# Patient Record
Sex: FEMALE | Race: WHITE | Employment: OTHER | URBAN - METROPOLITAN AREA
[De-identification: names, ages, dates, MRNs, and addresses within clinical notes are randomized per-mention and may not be internally consistent; named-entity substitution may affect disease eponyms.]

---

## 2017-02-06 ENCOUNTER — HOSPITAL ENCOUNTER (OUTPATIENT)
Dept: GENERAL RADIOLOGY | Age: 62
Discharge: HOME | End: 2017-02-06
Attending: INTERNAL MEDICINE
Payer: COMMERCIAL

## 2017-02-06 ENCOUNTER — OFFICE VISIT (OUTPATIENT)
Dept: INTERNAL MEDICINE CLINIC | Facility: CLINIC | Age: 62
End: 2017-02-06

## 2017-02-06 VITALS
WEIGHT: 229 LBS | HEART RATE: 92 BPM | HEIGHT: 63 IN | TEMPERATURE: 100 F | BODY MASS INDEX: 40.57 KG/M2 | DIASTOLIC BLOOD PRESSURE: 80 MMHG | SYSTOLIC BLOOD PRESSURE: 114 MMHG | RESPIRATION RATE: 12 BRPM

## 2017-02-06 DIAGNOSIS — R50.9 FEVER, UNSPECIFIED FEVER CAUSE: ICD-10-CM

## 2017-02-06 DIAGNOSIS — R05.9 COUGH: Primary | ICD-10-CM

## 2017-02-06 DIAGNOSIS — R05.9 COUGH: ICD-10-CM

## 2017-02-06 DIAGNOSIS — J02.9 SORE THROAT: ICD-10-CM

## 2017-02-06 LAB
CONTROL LINE PRESENT WITH A CLEAR BACKGROUND (YES/NO): YES YES/NO
KIT LOT #: NORMAL NUMERIC
STREP GRP A CUL-SCR: NEGATIVE

## 2017-02-06 PROCEDURE — 99213 OFFICE O/P EST LOW 20 MIN: CPT | Performed by: INTERNAL MEDICINE

## 2017-02-06 PROCEDURE — 87880 STREP A ASSAY W/OPTIC: CPT | Performed by: INTERNAL MEDICINE

## 2017-02-06 PROCEDURE — 71020 XR CHEST PA + LAT CHEST (CPT=71020): CPT

## 2017-02-06 PROCEDURE — 99214 OFFICE O/P EST MOD 30 MIN: CPT | Performed by: INTERNAL MEDICINE

## 2017-02-06 RX ORDER — CODEINE PHOSPHATE AND GUAIFENESIN 10; 100 MG/5ML; MG/5ML
5 SOLUTION ORAL EVERY 6 HOURS PRN
Qty: 240 ML | Refills: 0 | Status: SHIPPED | OUTPATIENT
Start: 2017-02-06 | End: 2019-01-03

## 2017-02-06 RX ORDER — AZITHROMYCIN 250 MG/1
TABLET, FILM COATED ORAL
Qty: 6 TABLET | Refills: 0 | Status: SHIPPED | OUTPATIENT
Start: 2017-02-06 | End: 2017-02-10

## 2017-02-06 NOTE — PROGRESS NOTES
HPI:    Patient ID: Stas Ricketts is a 64year old female. Sore Throat   This is a new problem. The current episode started in the past 7 days (3 darys). The problem has been unchanged.  The maximum temperature recorded prior to her arrival was 100.4 - Oral Tab Take  by mouth. Disp:  Rfl:    Loratadine-Pseudoephedrine ER (CLARITIN-D 24 HOUR)  MG Oral Tablet 24 Hr Take  by mouth.  Disp:  Rfl:      Allergies:No Known Allergies   PHYSICAL EXAM:   Physical Exam   Constitutional: She appears well-develop Rapid Strep [[id number]]      No orders of the defined types were placed in this encounter.        Meds This Visit:  No prescriptions requested or ordered in this encounter    Imaging & Referrals:  None       KT#[id number]

## 2017-02-09 ENCOUNTER — TELEPHONE (OUTPATIENT)
Dept: INTERNAL MEDICINE CLINIC | Facility: CLINIC | Age: 62
End: 2017-02-09

## 2017-02-09 NOTE — TELEPHONE ENCOUNTER
Wife requesting copy of pt's kidney CT report  Requesting to p/u copy when she is at her 2:30 appt tomorrow with Dr Andrés Good on file

## 2017-02-10 ENCOUNTER — OFFICE VISIT (OUTPATIENT)
Dept: INTERNAL MEDICINE CLINIC | Facility: CLINIC | Age: 62
End: 2017-02-10

## 2017-02-10 VITALS
DIASTOLIC BLOOD PRESSURE: 79 MMHG | BODY MASS INDEX: 40.75 KG/M2 | HEIGHT: 63 IN | WEIGHT: 230 LBS | TEMPERATURE: 98 F | HEART RATE: 73 BPM | OXYGEN SATURATION: 95 % | SYSTOLIC BLOOD PRESSURE: 114 MMHG | RESPIRATION RATE: 12 BRPM

## 2017-02-10 DIAGNOSIS — J18.9 PNEUMONIA OF LEFT LOWER LOBE DUE TO INFECTIOUS ORGANISM: Primary | ICD-10-CM

## 2017-02-10 PROCEDURE — 99212 OFFICE O/P EST SF 10 MIN: CPT | Performed by: INTERNAL MEDICINE

## 2017-02-10 PROCEDURE — 99213 OFFICE O/P EST LOW 20 MIN: CPT | Performed by: INTERNAL MEDICINE

## 2017-02-10 RX ORDER — AZITHROMYCIN 250 MG/1
TABLET, FILM COATED ORAL
Qty: 6 TABLET | Refills: 0 | Status: SHIPPED | OUTPATIENT
Start: 2017-02-10 | End: 2017-09-19

## 2017-02-10 RX ORDER — BENZONATATE 100 MG/1
100 CAPSULE ORAL 3 TIMES DAILY PRN
Qty: 21 CAPSULE | Refills: 0 | Status: SHIPPED | OUTPATIENT
Start: 2017-02-10 | End: 2020-03-02 | Stop reason: ALTCHOICE

## 2017-02-10 RX ORDER — ALBUTEROL SULFATE 90 UG/1
2 AEROSOL, METERED RESPIRATORY (INHALATION) EVERY 6 HOURS PRN
Qty: 1 INHALER | Refills: 0 | Status: SHIPPED | OUTPATIENT
Start: 2017-02-10 | End: 2017-09-19

## 2017-02-10 NOTE — TELEPHONE ENCOUNTER
Phone call to patient. S/W patient and advised I do not see an order for CT of Kidney for her. Patient states it is not for her;but, CT of her 's kidney. Message closed.

## 2017-02-10 NOTE — PROGRESS NOTES
HPI:    Patient ID: Lisa Lares is a 64year old female. Pneumonia  She complains of cough and shortness of breath. Primary symptoms comments: Here for ffup for pneumonia. This is a new problem. The current episode started in the past 7 days.  The pro 240 mL Rfl: 0   Biotin 5000 MCG Oral Tab Take  by mouth. Disp:  Rfl:    Vitamin C (VITAMIN C) 500 MG Oral Tab Take  by mouth. Disp:  Rfl:    Loratadine-Pseudoephedrine ER (CLARITIN-D 24 HOUR)  MG Oral Tablet 24 Hr Take  by mouth.  Disp:  Rfl:      All (PROAIR HFA) 108 (90 Base) MCG/ACT Inhalation Aero Soln 1 Inhaler 0      Sig: Inhale 2 puffs into the lungs every 6 (six) hours as needed for Wheezing.       azithromycin (ZITHROMAX Z-CARMELINA) 250 MG Oral Tab 6 tablet 0      Sig: Take two tablets by mouth today

## 2017-03-30 ENCOUNTER — TELEPHONE (OUTPATIENT)
Dept: INTERNAL MEDICINE CLINIC | Facility: CLINIC | Age: 62
End: 2017-03-30

## 2017-03-30 NOTE — TELEPHONE ENCOUNTER
Called pt and told her she is due for her ffup cxr and order in computer already. Pt agreed and will do it.

## 2017-03-31 ENCOUNTER — HOSPITAL ENCOUNTER (OUTPATIENT)
Dept: GENERAL RADIOLOGY | Age: 62
Discharge: HOME | End: 2017-03-31
Attending: INTERNAL MEDICINE
Payer: COMMERCIAL

## 2017-03-31 DIAGNOSIS — J18.9 PNEUMONIA OF LEFT LOWER LOBE DUE TO INFECTIOUS ORGANISM: ICD-10-CM

## 2017-03-31 PROCEDURE — 71020 XR CHEST PA + LAT CHEST (CPT=71020): CPT

## 2017-09-19 ENCOUNTER — OFFICE VISIT (OUTPATIENT)
Dept: INTERNAL MEDICINE CLINIC | Facility: CLINIC | Age: 62
End: 2017-09-19

## 2017-09-19 VITALS
RESPIRATION RATE: 12 BRPM | WEIGHT: 234 LBS | HEART RATE: 87 BPM | SYSTOLIC BLOOD PRESSURE: 127 MMHG | DIASTOLIC BLOOD PRESSURE: 81 MMHG | HEIGHT: 65 IN | BODY MASS INDEX: 38.99 KG/M2 | TEMPERATURE: 99 F

## 2017-09-19 DIAGNOSIS — R05.9 COUGH: Primary | ICD-10-CM

## 2017-09-19 PROCEDURE — 99212 OFFICE O/P EST SF 10 MIN: CPT | Performed by: INTERNAL MEDICINE

## 2017-09-19 PROCEDURE — 99214 OFFICE O/P EST MOD 30 MIN: CPT | Performed by: INTERNAL MEDICINE

## 2017-09-19 RX ORDER — AZITHROMYCIN 250 MG/1
TABLET, FILM COATED ORAL
Qty: 6 TABLET | Refills: 0 | Status: SHIPPED | OUTPATIENT
Start: 2017-09-19 | End: 2020-03-02 | Stop reason: ALTCHOICE

## 2017-09-19 RX ORDER — ALBUTEROL SULFATE 90 UG/1
2 AEROSOL, METERED RESPIRATORY (INHALATION) EVERY 6 HOURS PRN
Qty: 1 INHALER | Refills: 0 | Status: SHIPPED | OUTPATIENT
Start: 2017-09-19 | End: 2019-01-03

## 2017-09-19 NOTE — PROGRESS NOTES
HPI:    Patient ID: Didi Starks is a 58year old female. Cough   This is a new problem. The current episode started in the past 7 days (4 days). The problem has been unchanged. The cough is non-productive.  Associated symptoms include headaches, nasal times daily. Disp: 1 Inhaler Rfl: 0   guaiFENesin-codeine (CHERATUSSIN AC) 100-10 MG/5ML Oral Solution Take 5 mL by mouth every 6 (six) hours as needed for cough.  Disp: 240 mL Rfl: 0   azithromycin (ZITHROMAX Z-CARMELINA) 250 MG Oral Tab Take two tablets by mout for acute sinusitis . Call back if symptoms persist/worsens. No orders of the defined types were placed in this encounter.       Meds This Visit:  No prescriptions requested or ordered in this encounter    Imaging & Referrals:  None       #[id number]

## 2017-11-07 ENCOUNTER — TELEPHONE (OUTPATIENT)
Dept: OTHER | Age: 62
End: 2017-11-07

## 2017-11-07 NOTE — TELEPHONE ENCOUNTER
She called actually for her spouse who is also my patient  And concerned about possible depression and being somwhat paranoid. He already has apptment to see me this week so we agreed that I will talk to his spouse about this issue.

## 2017-11-07 NOTE — TELEPHONE ENCOUNTER
Patient called and requesting Dr Lew Mention to call her privately for personal matter, please advise, can call patient's cell number today. [place]

## 2017-11-09 ENCOUNTER — IMMUNIZATION (OUTPATIENT)
Dept: INTERNAL MEDICINE CLINIC | Facility: CLINIC | Age: 62
End: 2017-11-09

## 2017-11-09 DIAGNOSIS — Z23 NEED FOR VACCINATION: ICD-10-CM

## 2017-11-09 PROCEDURE — 90471 IMMUNIZATION ADMIN: CPT | Performed by: INTERNAL MEDICINE

## 2017-11-09 PROCEDURE — 90686 IIV4 VACC NO PRSV 0.5 ML IM: CPT | Performed by: INTERNAL MEDICINE

## 2018-05-17 ENCOUNTER — NURSE TRIAGE (OUTPATIENT)
Dept: INTERNAL MEDICINE CLINIC | Facility: CLINIC | Age: 63
End: 2018-05-17

## 2018-05-17 NOTE — TELEPHONE ENCOUNTER
Action Requested: Summary for Provider     []  Critical Lab, Recommendations Needed  [] Need Additional Advice  []   FYI    []   Need Orders  [] Need Medications Sent to Pharmacy  []  Other     SUMMARY: APPT CREATED, eye redness, swelling    Pt states for

## 2018-05-18 ENCOUNTER — OFFICE VISIT (OUTPATIENT)
Dept: INTERNAL MEDICINE CLINIC | Facility: CLINIC | Age: 63
End: 2018-05-18

## 2018-05-18 VITALS
TEMPERATURE: 98 F | HEIGHT: 64 IN | HEART RATE: 66 BPM | WEIGHT: 240 LBS | SYSTOLIC BLOOD PRESSURE: 118 MMHG | BODY MASS INDEX: 40.97 KG/M2 | DIASTOLIC BLOOD PRESSURE: 79 MMHG | RESPIRATION RATE: 12 BRPM

## 2018-05-18 DIAGNOSIS — J01.90 ACUTE NON-RECURRENT SINUSITIS, UNSPECIFIED LOCATION: ICD-10-CM

## 2018-05-18 DIAGNOSIS — H04.301 DACRYOCYSTITIS OF RIGHT LACRIMAL SAC: Primary | ICD-10-CM

## 2018-05-18 PROBLEM — R05.9 COUGH: Status: RESOLVED | Noted: 2017-02-06 | Resolved: 2018-05-18

## 2018-05-18 PROBLEM — R50.9 FEVER: Status: RESOLVED | Noted: 2017-02-06 | Resolved: 2018-05-18

## 2018-05-18 PROCEDURE — 99212 OFFICE O/P EST SF 10 MIN: CPT | Performed by: INTERNAL MEDICINE

## 2018-05-18 PROCEDURE — 99214 OFFICE O/P EST MOD 30 MIN: CPT | Performed by: INTERNAL MEDICINE

## 2018-05-18 RX ORDER — CHOLECALCIFEROL (VITAMIN D3) 125 MCG
1 CAPSULE ORAL DAILY
COMMUNITY

## 2018-05-18 RX ORDER — AMOXICILLIN AND CLAVULANATE POTASSIUM 875; 125 MG/1; MG/1
1 TABLET, FILM COATED ORAL 2 TIMES DAILY
Qty: 20 TABLET | Refills: 0 | Status: SHIPPED | OUTPATIENT
Start: 2018-05-18 | End: 2018-05-28

## 2018-05-18 RX ORDER — METHYLPREDNISOLONE 4 MG/1
4 TABLET ORAL
Refills: 0 | COMMUNITY
Start: 2018-05-17 | End: 2020-03-02 | Stop reason: ALTCHOICE

## 2018-05-18 NOTE — PROGRESS NOTES
HPI:    Patient ID: Melina Franco is a 58year old female. Eye Problem    The right (right upper eyelid) eye is affected. This is a new problem. The current episode started in the past 7 days (3 days). The problem occurs constantly.  The problem has bee benzonatate (TESSALON PERLES) 100 MG Oral Cap Take 1 capsule (100 mg total) by mouth 3 (three) times daily as needed for cough.  Disp: 21 capsule Rfl: 0   Fluticasone Propionate HFA (FLOVENT HFA) 110 MCG/ACT Inhalation Aerosol Inhale 2 puffs into the lungs She has no cervical adenopathy. Neurological: She is alert. Skin: She is not diaphoretic.               ASSESSMENT/PLAN:   (H04.301) Dacryocystitis of right lacrimal sac  (primary encounter diagnosis)  Plan: OPHTHALMOLOGY - INTERNAL        Pt has uliol

## 2018-05-29 ENCOUNTER — TELEPHONE (OUTPATIENT)
Dept: INTERNAL MEDICINE CLINIC | Facility: CLINIC | Age: 63
End: 2018-05-29

## 2018-05-29 NOTE — TELEPHONE ENCOUNTER
Pt  was on an amoxicillin-Pot clavulate that TUSHAR prescribed 5/18. States Friday she started having diarrhea that worsened on Saturday, so she spoke with TUSHAR who was on-call and advised her to stop the antibiotic.  She also started a probiotic on Sunday

## 2018-05-29 NOTE — TELEPHONE ENCOUNTER
Would not restart abx; for now, symptomatic treatment.  Can use her flonase NS, saline sinus rinses and otc claritin

## 2018-12-18 ENCOUNTER — OFFICE VISIT (OUTPATIENT)
Dept: OTOLARYNGOLOGY | Facility: CLINIC | Age: 63
End: 2018-12-18
Payer: COMMERCIAL

## 2018-12-18 VITALS
BODY MASS INDEX: 37.56 KG/M2 | DIASTOLIC BLOOD PRESSURE: 70 MMHG | TEMPERATURE: 98 F | SYSTOLIC BLOOD PRESSURE: 117 MMHG | HEIGHT: 64 IN | WEIGHT: 220 LBS

## 2018-12-18 DIAGNOSIS — H61.23 BILATERAL IMPACTED CERUMEN: Primary | ICD-10-CM

## 2018-12-18 PROCEDURE — 99212 OFFICE O/P EST SF 10 MIN: CPT | Performed by: OTOLARYNGOLOGY

## 2018-12-18 PROCEDURE — 92504 EAR MICROSCOPY EXAMINATION: CPT | Performed by: OTOLARYNGOLOGY

## 2018-12-18 PROCEDURE — 99203 OFFICE O/P NEW LOW 30 MIN: CPT | Performed by: OTOLARYNGOLOGY

## 2018-12-18 NOTE — PROGRESS NOTES
Susana Mello is a 61year old female. Patient presents with:  Ear Problem: Both ears are clogged, more in left ear, slight pain in left ear     HPI:   She is had a feeling of blockage in both of her ears, left greater than right. She has no pain.     Cur Alcohol use: No      Alcohol/week: 0.0 oz    Drug use: No       REVIEW OF SYSTEMS:   GENERAL HEALTH: feels well otherwise  GENERAL : denies fever, chills, sweats, weight loss, weight gain  SKIN: denies any unusual skin lesions or rashes  RESPIRATORY: manda

## 2019-01-02 ENCOUNTER — NURSE TRIAGE (OUTPATIENT)
Dept: OTHER | Age: 64
End: 2019-01-02

## 2019-01-02 NOTE — TELEPHONE ENCOUNTER
Patient returning a call,advisded DR Carlson's note and verbalized understanding. OV made for tomorrow 1/3/19 at 3 PM in [address] [facility], [address].       Note      No more apptment for today; I can see her tomorrow at 3pm.

## 2019-01-02 NOTE — TELEPHONE ENCOUNTER
Action Requested: Summary for Provider     []  Critical Lab, Recommendations Needed  [x] Need Additional Advice  []   FYI    []   Need Orders  [] Need Medications Sent to Pharmacy  []  Other     SUMMARY: Patient calling with complaint of cough.    Patient a

## 2019-01-03 ENCOUNTER — OFFICE VISIT (OUTPATIENT)
Dept: INTERNAL MEDICINE CLINIC | Facility: CLINIC | Age: 64
End: 2019-01-03
Payer: COMMERCIAL

## 2019-01-03 VITALS
BODY MASS INDEX: 37.56 KG/M2 | HEART RATE: 71 BPM | OXYGEN SATURATION: 96 % | WEIGHT: 220 LBS | HEIGHT: 64 IN | DIASTOLIC BLOOD PRESSURE: 78 MMHG | SYSTOLIC BLOOD PRESSURE: 114 MMHG | TEMPERATURE: 98 F

## 2019-01-03 DIAGNOSIS — R05.9 COUGH: Primary | ICD-10-CM

## 2019-01-03 PROCEDURE — 99214 OFFICE O/P EST MOD 30 MIN: CPT | Performed by: INTERNAL MEDICINE

## 2019-01-03 PROCEDURE — 99212 OFFICE O/P EST SF 10 MIN: CPT | Performed by: INTERNAL MEDICINE

## 2019-01-03 RX ORDER — CODEINE PHOSPHATE AND GUAIFENESIN 10; 100 MG/5ML; MG/5ML
5 SOLUTION ORAL EVERY 6 HOURS PRN
Qty: 240 ML | Refills: 0 | Status: SHIPPED | OUTPATIENT
Start: 2019-01-03 | End: 2020-03-02 | Stop reason: ALTCHOICE

## 2019-01-03 RX ORDER — ALBUTEROL SULFATE 90 UG/1
2 AEROSOL, METERED RESPIRATORY (INHALATION) EVERY 6 HOURS PRN
Qty: 1 INHALER | Refills: 0 | Status: SHIPPED | OUTPATIENT
Start: 2019-01-03 | End: 2019-02-15

## 2019-01-03 NOTE — PROGRESS NOTES
HPI:    Patient ID: Cynthia Carpenter is a 61year old female. Cough   This is a new problem. The current episode started in the past 7 days (5 days). The problem has been gradually improving. The problem occurs every few hours.  The cough is productive of 240 mL Rfl: 0   guaiFENesin-codeine (CHERATUSSIN AC) 100-10 MG/5ML Oral Solution Take 5 mL by mouth every 6 (six) hours as needed for cough. Disp: 240 mL Rfl: 0   Biotin 5000 MCG Oral Tab Take  by mouth.  Disp:  Rfl:    Vitamin C (VITAMIN C) 500 MG Oral Tab defined types were placed in this encounter.       Meds This Visit:  Requested Prescriptions      No prescriptions requested or ordered in this encounter       Imaging & Referrals:  None       #[id number]

## 2019-02-15 ENCOUNTER — OFFICE VISIT (OUTPATIENT)
Dept: INTERNAL MEDICINE CLINIC | Facility: CLINIC | Age: 64
End: 2019-02-15
Payer: COMMERCIAL

## 2019-02-15 VITALS
HEIGHT: 64 IN | DIASTOLIC BLOOD PRESSURE: 76 MMHG | WEIGHT: 242 LBS | SYSTOLIC BLOOD PRESSURE: 124 MMHG | OXYGEN SATURATION: 94 % | HEART RATE: 84 BPM | BODY MASS INDEX: 41.32 KG/M2 | TEMPERATURE: 98 F

## 2019-02-15 DIAGNOSIS — R05.9 COUGH: Primary | ICD-10-CM

## 2019-02-15 PROCEDURE — 99214 OFFICE O/P EST MOD 30 MIN: CPT | Performed by: INTERNAL MEDICINE

## 2019-02-15 PROCEDURE — 99212 OFFICE O/P EST SF 10 MIN: CPT | Performed by: INTERNAL MEDICINE

## 2019-02-15 RX ORDER — ALBUTEROL SULFATE 90 UG/1
2 AEROSOL, METERED RESPIRATORY (INHALATION) EVERY 6 HOURS PRN
Qty: 1 INHALER | Refills: 0 | Status: SHIPPED | OUTPATIENT
Start: 2019-02-15 | End: 2020-03-02 | Stop reason: ALTCHOICE

## 2019-02-15 RX ORDER — PREDNISONE 10 MG/1
30 TABLET ORAL DAILY
Qty: 15 TABLET | Refills: 0 | Status: SHIPPED | OUTPATIENT
Start: 2019-02-15 | End: 2020-03-02 | Stop reason: ALTCHOICE

## 2019-02-15 RX ORDER — AZITHROMYCIN 250 MG/1
TABLET, FILM COATED ORAL
Qty: 6 TABLET | Refills: 0 | Status: SHIPPED | OUTPATIENT
Start: 2019-02-15 | End: 2020-03-02 | Stop reason: ALTCHOICE

## 2019-02-15 NOTE — PROGRESS NOTES
HPI:    Patient ID: Channing Tafoya is a 61year old female. Cough   This is a new problem. The current episode started in the past 7 days. The problem has been gradually worsening. The problem occurs constantly. The cough is non-productive.  Associated s 21 capsule Rfl: 0   Biotin 5000 MCG Oral Tab Take  by mouth. Disp:  Rfl:    cholecalciferol ( VITAMIN D) 1000 UNITS Oral Cap Take  by mouth. Disp:  Rfl:    Loratadine-Pseudoephedrine ER (CLARITIN-D 24 HOUR)  MG Oral Tablet 24 Hr Take  by mouth.  Dis Prescriptions      No prescriptions requested or ordered in this encounter       Imaging & Referrals:  None       FB#[id number]

## 2019-02-19 ENCOUNTER — NURSE TRIAGE (OUTPATIENT)
Dept: OTHER | Age: 64
End: 2019-02-19

## 2019-02-19 NOTE — TELEPHONE ENCOUNTER
Pt returning call as she wants to make sure before starting abx that  approves it. Pt states she is better than appt time but still not good. During conversation proceeded to cough deeply and forcefully.   Pt wants to know if she should take abx

## 2019-02-19 NOTE — TELEPHONE ENCOUNTER
Patient called stating she still has a persistent cough. Advised her of Dr. Phoenix Bauer note below from 2/15/19 office visit. She said she has not taken any of the prescribed medications.  Told her that she should get them filled and take them, to use a hu

## 2019-02-19 NOTE — TELEPHONE ENCOUNTER
Patient advised of recommendations per Dr Tamar Velasquez, verbalized understanding and agreed. Will return call if symptoms continue or any other concerns.

## 2019-09-27 ENCOUNTER — NURSE ONLY (OUTPATIENT)
Dept: INTERNAL MEDICINE CLINIC | Facility: CLINIC | Age: 64
End: 2019-09-27
Payer: COMMERCIAL

## 2019-09-27 DIAGNOSIS — Z23 IMMUNIZATION DUE: Primary | ICD-10-CM

## 2019-09-27 PROCEDURE — 90471 IMMUNIZATION ADMIN: CPT | Performed by: INTERNAL MEDICINE

## 2019-09-27 PROCEDURE — 90686 IIV4 VACC NO PRSV 0.5 ML IM: CPT | Performed by: INTERNAL MEDICINE

## 2020-02-25 ENCOUNTER — OFFICE VISIT (OUTPATIENT)
Dept: INTERNAL MEDICINE CLINIC | Facility: CLINIC | Age: 65
End: 2020-02-25
Payer: COMMERCIAL

## 2020-02-25 VITALS
SYSTOLIC BLOOD PRESSURE: 126 MMHG | DIASTOLIC BLOOD PRESSURE: 76 MMHG | WEIGHT: 243 LBS | TEMPERATURE: 99 F | HEIGHT: 64 IN | HEART RATE: 70 BPM | RESPIRATION RATE: 12 BRPM | BODY MASS INDEX: 41.48 KG/M2 | OXYGEN SATURATION: 99 %

## 2020-02-25 DIAGNOSIS — R05.9 COUGH: Primary | ICD-10-CM

## 2020-02-25 PROCEDURE — 99214 OFFICE O/P EST MOD 30 MIN: CPT | Performed by: INTERNAL MEDICINE

## 2020-02-25 RX ORDER — MULTIVITAMIN
1 TABLET ORAL DAILY
COMMUNITY

## 2020-02-25 RX ORDER — BENZONATATE 100 MG/1
100 CAPSULE ORAL 3 TIMES DAILY PRN
Qty: 21 CAPSULE | Refills: 0 | Status: SHIPPED | OUTPATIENT
Start: 2020-02-25

## 2020-02-25 NOTE — PROGRESS NOTES
HPI:    Patient ID: Марина Meyer is a 59year old female. Cough   This is a new problem. The current episode started in the past 7 days. The problem has been gradually improving. The problem occurs every few hours. The cough is productive of sputum.  A Inhale 2 puffs into the lungs 2 (two) times daily.  (Patient not taking: Reported on 2/25/2020 ) 1 Inhaler 0   • Albuterol Sulfate HFA (PROAIR HFA) 108 (90 Base) MCG/ACT Inhalation Aero Soln Inhale 2 puffs into the lungs every 6 (six) hours as needed for [employer] normal and breath sounds normal. No stridor. No respiratory distress. She has no wheezes. She has no rales. Abdominal: Soft. Bowel sounds are normal. She exhibits no distension. Musculoskeletal:         General: No edema.    Lymphadenopathy:     She has

## 2020-03-02 ENCOUNTER — TELEPHONE (OUTPATIENT)
Dept: INTERNAL MEDICINE CLINIC | Facility: CLINIC | Age: 65
End: 2020-03-02

## 2020-03-03 RX ORDER — AZITHROMYCIN 250 MG/1
TABLET, FILM COATED ORAL
Qty: 6 TABLET | Refills: 0 | Status: SHIPPED | OUTPATIENT
Start: 2020-03-03

## 2020-03-03 NOTE — TELEPHONE ENCOUNTER
Advised patient of Dr. Arabella Hodge note. Patient verbalized understanding  Patient however states cough is keeping her up at night. States she slept good last night and wants to hold z-pack tonight to see how she feels.    If she is feeling better she wi

## 2020-12-03 ENCOUNTER — TELEPHONE (OUTPATIENT)
Dept: INTERNAL MEDICINE CLINIC | Facility: CLINIC | Age: 65
End: 2020-12-03

## 2020-12-03 NOTE — TELEPHONE ENCOUNTER
Patient reports received vm today to call back for x-ray results (reports vm from a male). Patient confirms has not had an x-ray done and is not waiting on any results, wrong #.

## 2020-12-18 ENCOUNTER — TELEPHONE (OUTPATIENT)
Dept: INTERNAL MEDICINE CLINIC | Facility: CLINIC | Age: 65
End: 2020-12-18

## 2020-12-18 NOTE — TELEPHONE ENCOUNTER
Patient reports has not had her labs or physical done in a long time, she wanted general labs done to check her health.  Advised patient by Feb 2021 will be due for an appt (1 yr from last visit) advised to schedule her physical for Jan-Feb, patient agreed

## 2020-12-18 NOTE — TELEPHONE ENCOUNTER
Left message for patient, no active labs found. patient was seen on 02/25/2020 for a cough. No labs were generated at that time.

## 2020-12-18 NOTE — TELEPHONE ENCOUNTER
Pt. States that she did not get her labs done during her last visit, so she wants to know if she can get her labs drawn on Monday 12/21/2020, when she come in of nurse visit for her flu shot?

## 2020-12-21 ENCOUNTER — IMMUNIZATION (OUTPATIENT)
Dept: INTERNAL MEDICINE CLINIC | Facility: CLINIC | Age: 65
End: 2020-12-21
Payer: MEDICARE

## 2020-12-21 DIAGNOSIS — Z23 NEED FOR VACCINATION: ICD-10-CM

## 2020-12-21 PROCEDURE — G0008 ADMIN INFLUENZA VIRUS VAC: HCPCS | Performed by: INTERNAL MEDICINE

## 2020-12-21 PROCEDURE — 90662 IIV NO PRSV INCREASED AG IM: CPT | Performed by: INTERNAL MEDICINE

## 2021-03-15 DIAGNOSIS — Z23 NEED FOR VACCINATION: ICD-10-CM

## 2021-03-31 ENCOUNTER — TELEPHONE (OUTPATIENT)
Dept: INTERNAL MEDICINE CLINIC | Facility: CLINIC | Age: 66
End: 2021-03-31

## 2021-03-31 NOTE — TELEPHONE ENCOUNTER
Patient is requesting lab work orders since its been over a year. Please call when approved. [phone].

## 2021-04-19 ENCOUNTER — EKG ENCOUNTER (OUTPATIENT)
Dept: LAB | Age: 66
End: 2021-04-19
Attending: INTERNAL MEDICINE
Payer: MEDICARE

## 2021-04-19 ENCOUNTER — OFFICE VISIT (OUTPATIENT)
Dept: INTERNAL MEDICINE CLINIC | Facility: CLINIC | Age: 66
End: 2021-04-19
Payer: MEDICARE

## 2021-04-19 ENCOUNTER — LAB ENCOUNTER (OUTPATIENT)
Dept: LAB | Age: 66
End: 2021-04-19
Attending: INTERNAL MEDICINE
Payer: MEDICARE

## 2021-04-19 VITALS
TEMPERATURE: 98 F | BODY MASS INDEX: 42.52 KG/M2 | HEIGHT: 63.5 IN | WEIGHT: 243 LBS | DIASTOLIC BLOOD PRESSURE: 76 MMHG | HEART RATE: 64 BPM | RESPIRATION RATE: 12 BRPM | SYSTOLIC BLOOD PRESSURE: 116 MMHG

## 2021-04-19 DIAGNOSIS — Z00.00 MEDICARE ANNUAL WELLNESS VISIT, INITIAL: ICD-10-CM

## 2021-04-19 DIAGNOSIS — Z12.31 ENCOUNTER FOR SCREENING MAMMOGRAM FOR BREAST CANCER: ICD-10-CM

## 2021-04-19 DIAGNOSIS — E04.1 THYROID NODULE: ICD-10-CM

## 2021-04-19 DIAGNOSIS — E55.9 VITAMIN D DEFICIENCY: ICD-10-CM

## 2021-04-19 DIAGNOSIS — F32.A ANXIETY AND DEPRESSION: ICD-10-CM

## 2021-04-19 DIAGNOSIS — Z78.0 MENOPAUSE: ICD-10-CM

## 2021-04-19 DIAGNOSIS — Z01.419 WELL FEMALE EXAM WITH ROUTINE GYNECOLOGICAL EXAM: ICD-10-CM

## 2021-04-19 DIAGNOSIS — Z00.00 MEDICARE ANNUAL WELLNESS VISIT, INITIAL: Primary | ICD-10-CM

## 2021-04-19 DIAGNOSIS — E78.00 PURE HYPERCHOLESTEROLEMIA: ICD-10-CM

## 2021-04-19 DIAGNOSIS — F41.9 ANXIETY AND DEPRESSION: ICD-10-CM

## 2021-04-19 PROCEDURE — 84439 ASSAY OF FREE THYROXINE: CPT

## 2021-04-19 PROCEDURE — 93005 ELECTROCARDIOGRAM TRACING: CPT

## 2021-04-19 PROCEDURE — G0402 INITIAL PREVENTIVE EXAM: HCPCS | Performed by: INTERNAL MEDICINE

## 2021-04-19 PROCEDURE — 85025 COMPLETE CBC W/AUTO DIFF WBC: CPT

## 2021-04-19 PROCEDURE — 93010 ELECTROCARDIOGRAM REPORT: CPT | Performed by: INTERNAL MEDICINE

## 2021-04-19 PROCEDURE — 82306 VITAMIN D 25 HYDROXY: CPT

## 2021-04-19 PROCEDURE — 84481 FREE ASSAY (FT-3): CPT

## 2021-04-19 PROCEDURE — 81001 URINALYSIS AUTO W/SCOPE: CPT

## 2021-04-19 PROCEDURE — 36415 COLL VENOUS BLD VENIPUNCTURE: CPT

## 2021-04-19 PROCEDURE — 84443 ASSAY THYROID STIM HORMONE: CPT

## 2021-04-19 PROCEDURE — 80061 LIPID PANEL: CPT

## 2021-04-19 PROCEDURE — 80053 COMPREHEN METABOLIC PANEL: CPT

## 2021-04-19 NOTE — PROGRESS NOTES
HPI:   Luly Humphreys is a 72year old female who presents for a Medicare Initial Preventative Physical Exam (Welcome to Medicare- < 12 months on Medicare).       Annual Physical due on 04/19/2022        Fall/Risk Assessment   She has been screened for Fall Patient Care Team:  Juaquin Bamberger, MD as PCP - General (Internal Medicine)    Patient Active Problem List:     Hyperlipidemia     Medicare annual wellness visit, initial     Thyroid nodule     Vitamin D deficiency     Anxiety and depression     BMI sinus pain or ST  LUNGS: denies shortness of breath with exertion  CARDIOVASCULAR: denies chest pain on exertion  GI: denies abdominal pain, denies heartburn  : denies dysuria, vaginal discharge or itching, no complaint of urinary incontinence   MUSCULOS rhythm, S1 and S2 normal, no murmur, rub, or gallop   Abdomen:   Soft, non-tender, bowel sounds active all four quadrants,  no masses, no organomegaly   Pelvic: Deferred   Extremities: Extremities normal, atraumatic, no cyanosis or edema   Pulses: 2+ and s ffup. We will also check TFT.     (E55.9) Vitamin D deficiency  Plan: VITAMIN D, 25-HYDROXY        Check vit D level.  Continue with vit d supplement.    (Z12.31) Encounter for screening mammogram for breast cancer  Plan: [facility] [place] 2D+3D SCREENING BILAT SCHEDULE Internal Lab or Procedure External Lab or Procedure   Diabetes Screening      HbgA1C   Annually No results found for: A1C No flowsheet data found.     Fasting Blood Sugar (FSB)Annually GLUCOSE (P) (mg/dL)   Date Value   10/13/2015 97          Cardi End-stage renal disease   Hemophiliacs who received Factor VIII or IX concentrates   Clients of institutions for the mentally retarded   Persons who live in the same house as a HepB virus carrier   Homosexual men   Illicit injectable drug abusers     Tet

## 2021-04-24 ENCOUNTER — TELEPHONE (OUTPATIENT)
Dept: INTERNAL MEDICINE CLINIC | Facility: CLINIC | Age: 66
End: 2021-04-24

## 2021-04-24 DIAGNOSIS — R94.31 ABNORMAL EKG: Primary | ICD-10-CM

## 2021-07-29 ENCOUNTER — HOSPITAL ENCOUNTER (OUTPATIENT)
Dept: ULTRASOUND IMAGING | Age: 66
Discharge: HOME | End: 2021-07-29
Attending: INTERNAL MEDICINE
Payer: MEDICARE

## 2021-07-29 ENCOUNTER — HOSPITAL ENCOUNTER (OUTPATIENT)
Dept: MAMMOGRAPHY | Age: 66
Discharge: HOME | End: 2021-07-29
Attending: INTERNAL MEDICINE
Payer: MEDICARE

## 2021-07-29 DIAGNOSIS — E04.1 THYROID NODULE: ICD-10-CM

## 2021-07-29 DIAGNOSIS — Z12.31 ENCOUNTER FOR SCREENING MAMMOGRAM FOR BREAST CANCER: ICD-10-CM

## 2021-07-29 PROCEDURE — 77063 BREAST TOMOSYNTHESIS BI: CPT | Performed by: INTERNAL MEDICINE

## 2021-07-29 PROCEDURE — 77067 SCR MAMMO BI INCL CAD: CPT | Performed by: INTERNAL MEDICINE

## 2021-07-29 PROCEDURE — 76536 US EXAM OF HEAD AND NECK: CPT | Performed by: INTERNAL MEDICINE

## 2021-08-20 ENCOUNTER — HOSPITAL ENCOUNTER (OUTPATIENT)
Dept: MAMMOGRAPHY | Facility: HOSPITAL | Age: 66
Discharge: HOME | End: 2021-08-20
Attending: INTERNAL MEDICINE
Payer: MEDICARE

## 2021-08-20 ENCOUNTER — HOSPITAL ENCOUNTER (OUTPATIENT)
Dept: ULTRASOUND IMAGING | Facility: HOSPITAL | Age: 66
Discharge: HOME | End: 2021-08-20
Attending: INTERNAL MEDICINE
Payer: MEDICARE

## 2021-08-20 DIAGNOSIS — R92.8 ABNORMAL MAMMOGRAM: ICD-10-CM

## 2021-08-20 PROCEDURE — 77061 BREAST TOMOSYNTHESIS UNI: CPT | Performed by: INTERNAL MEDICINE

## 2021-08-20 PROCEDURE — 76642 ULTRASOUND BREAST LIMITED: CPT | Performed by: INTERNAL MEDICINE

## 2021-08-20 PROCEDURE — 77065 DX MAMMO INCL CAD UNI: CPT | Performed by: INTERNAL MEDICINE

## 2021-12-28 ENCOUNTER — TELEPHONE (OUTPATIENT)
Dept: INTERNAL MEDICINE CLINIC | Facility: CLINIC | Age: 66
End: 2021-12-28

## 2021-12-28 DIAGNOSIS — Z20.828 EXPOSURE TO SARS-ASSOCIATED CORONAVIRUS: Primary | ICD-10-CM

## 2021-12-28 NOTE — TELEPHONE ENCOUNTER
Patient calling, identified name and , has been exposed to Arielle  + person  On   Patient is asymptomatic     Patient informed NOT to be COVID tested until  5-7 days after exposure to ensure quality testing and resulting.   Provided informa

## 2021-12-30 ENCOUNTER — LAB ENCOUNTER (OUTPATIENT)
Dept: LAB | Age: 66
End: 2021-12-30
Attending: INTERNAL MEDICINE
Payer: MEDICARE

## 2021-12-30 DIAGNOSIS — Z20.828 EXPOSURE TO SARS-ASSOCIATED CORONAVIRUS: ICD-10-CM

## 2022-01-01 LAB — SARS-COV-2 RNA RESP QL NAA+PROBE: NOT DETECTED

## 2022-01-03 ENCOUNTER — HOSPITAL ENCOUNTER (OUTPATIENT)
Dept: BONE DENSITY | Age: 67
Discharge: HOME | End: 2022-01-03
Attending: INTERNAL MEDICINE
Payer: MEDICARE

## 2022-01-03 DIAGNOSIS — Z78.0 MENOPAUSE: ICD-10-CM

## 2022-01-03 PROCEDURE — 77080 DXA BONE DENSITY AXIAL: CPT | Performed by: INTERNAL MEDICINE

## 2022-01-06 ENCOUNTER — NURSE TRIAGE (OUTPATIENT)
Dept: INTERNAL MEDICINE CLINIC | Facility: CLINIC | Age: 67
End: 2022-01-06

## 2022-01-06 NOTE — TELEPHONE ENCOUNTER
Patient calls and states that her daughter, son in law an 3year old grandson tested positive for Covid 1/2/22. Has not seen them since 12/25, however she wants to go help and watch the baby next week when her daughter returns to work (from home).   Dylan

## 2022-01-31 ENCOUNTER — TELEPHONE (OUTPATIENT)
Dept: INTERNAL MEDICINE CLINIC | Facility: CLINIC | Age: 67
End: 2022-01-31

## 2022-01-31 NOTE — TELEPHONE ENCOUNTER
pls call and remind pt she is due for her 6month ffup right Breast US recommended by radiologist on her prevous mammogram.

## 2022-01-31 NOTE — TELEPHONE ENCOUNTER
Called patient and left message regarding 6 month follow up appointment. Supplied phone number to schedule appointment.

## 2022-04-20 ENCOUNTER — HOSPITAL ENCOUNTER (OUTPATIENT)
Dept: ULTRASOUND IMAGING | Facility: HOSPITAL | Age: 67
Discharge: HOME | End: 2022-04-20
Attending: INTERNAL MEDICINE
Payer: MEDICARE

## 2022-04-20 DIAGNOSIS — R92.8 ABNORMALITY OF RIGHT BREAST ON SCREENING MAMMOGRAM: ICD-10-CM

## 2022-04-20 PROCEDURE — 76642 ULTRASOUND BREAST LIMITED: CPT | Performed by: INTERNAL MEDICINE

## 2022-05-11 ENCOUNTER — LAB ENCOUNTER (OUTPATIENT)
Dept: LAB | Age: 67
End: 2022-05-11
Attending: INTERNAL MEDICINE
Payer: MEDICARE

## 2022-05-11 ENCOUNTER — TELEPHONE (OUTPATIENT)
Dept: INTERNAL MEDICINE CLINIC | Facility: CLINIC | Age: 67
End: 2022-05-11

## 2022-05-11 DIAGNOSIS — Z11.52 ENCOUNTER FOR SCREENING FOR COVID-19: ICD-10-CM

## 2022-05-11 NOTE — TELEPHONE ENCOUNTER
Spoke to patient. She requested a Covid test because her  is sick and she woke up feeling ill. She has a sore throat and feels achy. Placed order per protocol. Asked her to call back if she would like to schedule a video visit if symptoms worsen. She was agreeable.

## 2022-05-12 ENCOUNTER — TELEPHONE (OUTPATIENT)
Dept: INTERNAL MEDICINE CLINIC | Facility: CLINIC | Age: 67
End: 2022-05-12

## 2022-05-12 LAB — SARS-COV-2 RNA RESP QL NAA+PROBE: DETECTED

## 2022-05-12 NOTE — TELEPHONE ENCOUNTER
Verified name and . Results/recommendations reviewed with pt and pt verb understanding    Pt states she will go to IC in the morning. Pt denies SOB, weakness, chest pain, wheezing.     Advised pt if those symptoms develop to go to ER and pt agrees to plan

## 2022-05-12 NOTE — TELEPHONE ENCOUNTER
pls notifhy pt; her covid test was positive.  She is a candidate for monoclonal ab infution so would recommend going to ER or UC for this treatment

## 2022-05-13 ENCOUNTER — HOSPITAL ENCOUNTER (OUTPATIENT)
Age: 67
Discharge: HOME | End: 2022-05-13
Payer: MEDICARE

## 2022-05-13 ENCOUNTER — TELEPHONE (OUTPATIENT)
Dept: CASE MANAGEMENT | Age: 67
End: 2022-05-13

## 2022-05-13 VITALS
TEMPERATURE: 99 F | HEART RATE: 72 BPM | BODY MASS INDEX: 38.98 KG/M2 | HEIGHT: 63 IN | RESPIRATION RATE: 18 BRPM | DIASTOLIC BLOOD PRESSURE: 80 MMHG | OXYGEN SATURATION: 95 % | SYSTOLIC BLOOD PRESSURE: 119 MMHG | WEIGHT: 220 LBS

## 2022-05-13 DIAGNOSIS — U07.1 COVID-19 VIRUS INFECTION: Primary | ICD-10-CM

## 2022-05-13 RX ORDER — BEBTELOVIMAB 87.5 MG/ML
175 INJECTION, SOLUTION INTRAVENOUS ONCE
Status: COMPLETED | OUTPATIENT
Start: 2022-05-13 | End: 2022-05-13

## 2022-05-13 NOTE — TELEPHONE ENCOUNTER
Pt received MAB infusion at [facility] on 5/13/22 for COVID-19. Please follow-up with pt for post-infusion assessment and home monitoring if needed. Thank you.

## 2022-05-14 ENCOUNTER — TELEPHONE (OUTPATIENT)
Dept: INTERNAL MEDICINE CLINIC | Facility: CLINIC | Age: 67
End: 2022-05-14

## 2022-05-14 NOTE — TELEPHONE ENCOUNTER
Patient declines adverse reaction symptoms of MAB infusion. Home Monitoring Day 1 of 3. What  was your temp today? - Does not feel feverish    How did you take your temp? What was your pulse ox today? Will have one delivered today. RN educated patient about pulse oximeter, how to use it, numbers in normal range, and what to do if abnormal range. Are you feeling short of breath today? No      Is the shortness of breath better, the same, or worse than yesterday?   about the same  Patient speaking with full sentences. Advised patient to take 10 deep breaths every hour while awake to prevent atelectasis and pneumonia. Are you having a cough today? Yes, usually at night    Is the cough better, the same, or worse than yesterday? better  Denies chest pain. Nasal Congestion or Runny Nose? Yes     Are you experiencing weakness today? Yes    Is the weakness better, the same or worse than yesterday? better    How is your appetite compared to yesterday?     about the same    Are you vomiting? No    Diarrhea? No       Any loss of taste or smell? No      Encouraged to stay hydrated and take Tylenol as directed for fever > 100F     RN advised patient to call us back if questions about symptoms, however if symptoms get severely worse or if patient experiences shortness of breath, chest pain, severe pain, persistent low oxygen saturation readings, patient should go to ER. Advised She will be called once more on Monday. Scheduled on Call Back for [facility] [facility]. Patient verbalizes understanding of when to call our office and when to go to ER. Patient verbalizes understanding that we have On-Call provider available after-hours, [address] [place] [address], Urgent Cares and Emergency Rooms.

## 2022-05-14 NOTE — TELEPHONE ENCOUNTER
Spoke to patient , she had monoclonal ab infusion yesterday , has had covid for 4 days, and then this morning had diarrhea, she will follow a BRAT diet , increase fluids , no caffeine , Pepto bismal prn.

## 2022-05-16 NOTE — TELEPHONE ENCOUNTER
Home Monitoring Day 3 of 3    What  was your temp today? - Denies fever    How did you take your temp? Did not check today    What was your pulse ox today? 95%-97%  RN educated patient about pulse oximeter, how to use it, numbers in normal range, and what to do if abnormal range. Are you feeling short of breath today? No      Is the shortness of breath better, the same, or worse than yesterday? better  Patient speaking with full sentences. Advised patient to take 10 deep breaths every hour while awake to prevent atelectasis and pneumonia. Are you having a cough today? Yes    Is the cough better, the same, or worse than yesterday? better  Denies chest pain. Nasal Congestion or Runny Nose? Yes     Are you experiencing weakness today? Yes    Is the weakness better, the same or worse than yesterday?     about the same    How is your appetite compared to yesterday?     about the same    Are you vomiting? No    Diarrhea? Yes but Only Saturday evening and Sunday morning. Improved, but still a little watery/loose. Any loss of taste or smell? No    She's drinking pediatlyte. Has trouble sleeping when she wants to. Tried melatonin 6mg last night, did not work. Provided education about sleep hygiene. Patient agreeable to call back if persisting issues with sleep, and needs help MD.     Encouraged to stay hydrated and take Tylenol as directed for fever > 100F    Offered virtual visit, declines for now. Encouraged to make a virtual appointment if help needed to manage symptoms. RN advised patient to call us back if questions about symptoms, however if symptoms get severely worse or if patient experiences shortness of breath, chest pain, severe pain, persistent low oxygen saturation readings, patient should go to ER. Patient verbalizes understanding of when to call our office and when to go to ER. Patient verbalizes understanding of when to call our office and when to go to ER. Patient verbalizes understanding that we have On-Call provider available after-hours, [facility], Urgent Cares and Emergency Rooms. No future appointments.

## 2022-05-31 ENCOUNTER — OFFICE VISIT (OUTPATIENT)
Dept: INTERNAL MEDICINE CLINIC | Facility: CLINIC | Age: 67
End: 2022-05-31
Payer: MEDICARE

## 2022-05-31 VITALS
BODY MASS INDEX: 39.37 KG/M2 | DIASTOLIC BLOOD PRESSURE: 80 MMHG | SYSTOLIC BLOOD PRESSURE: 118 MMHG | HEART RATE: 70 BPM | HEIGHT: 63 IN | OXYGEN SATURATION: 99 % | WEIGHT: 222.19 LBS

## 2022-05-31 DIAGNOSIS — R35.0 URINARY FREQUENCY: Primary | ICD-10-CM

## 2022-05-31 LAB
APPEARANCE: CLEAR
BILIRUB UR QL: NEGATIVE
BILIRUBIN: NEGATIVE
COLOR UR: YELLOW
GLUCOSE (URINE DIPSTICK): NEGATIVE MG/DL
GLUCOSE UR-MCNC: NEGATIVE MG/DL
KETONES (URINE DIPSTICK): NEGATIVE MG/DL
KETONES UR-MCNC: NEGATIVE MG/DL
MULTISTIX LOT#: ABNORMAL NUMERIC
NITRITE UR QL STRIP.AUTO: NEGATIVE
NITRITE, URINE: NEGATIVE
PH UR: 7 [PH] (ref 5–8)
PH, URINE: 7 (ref 4.5–8)
PROT UR-MCNC: NEGATIVE MG/DL
PROTEIN (URINE DIPSTICK): NEGATIVE MG/DL
SP GR UR STRIP: 1 (ref 1–1.03)
SPECIFIC GRAVITY: 1.01 (ref 1–1.03)
URINE-COLOR: CLEAR
UROBILINOGEN UR STRIP-ACNC: <2
UROBILINOGEN,SEMI-QN: 0.2 MG/DL (ref 0–1.9)
VIT C UR-MCNC: NEGATIVE MG/DL

## 2022-05-31 PROCEDURE — 99213 OFFICE O/P EST LOW 20 MIN: CPT | Performed by: NURSE PRACTITIONER

## 2022-05-31 PROCEDURE — 81003 URINALYSIS AUTO W/O SCOPE: CPT | Performed by: NURSE PRACTITIONER

## 2022-05-31 RX ORDER — NITROFURANTOIN 25; 75 MG/1; MG/1
100 CAPSULE ORAL 2 TIMES DAILY
Qty: 20 CAPSULE | Refills: 0 | Status: SHIPPED | OUTPATIENT
Start: 2022-05-31 | End: 2022-06-10

## 2022-09-01 ENCOUNTER — TELEPHONE (OUTPATIENT)
Dept: INTERNAL MEDICINE CLINIC | Facility: CLINIC | Age: 67
End: 2022-09-01

## 2022-09-01 NOTE — TELEPHONE ENCOUNTER
Pt stated she scraped her face (left cheek 2\" wide) with an old picture frame with metal on the end, cleaning with hydroperoxide and neosporin Abx oint, advised to clean with antibacterial soap rinse well, redness less since Saturday  Have not had Tetanus over 10 years, she's in [place] and they only provide TDAP at the clinic -asking if ok to wait for just a Tetanus next week at [facility] normal...

## 2022-09-01 NOTE — TELEPHONE ENCOUNTER
Patient returned call, states \"I actually was calling for my sister-in-law, [place], I shouldn't have said it was me\" and \"I don't want to bother the doctor with this\"    Advised to bring family member to nearest [facility] for evaluation and treatment.   Stating Adriana Hernandez is afraid of a reaction from the shot\", advised there is also risk of tetanus from the wound, verbalizes understanding

## 2022-10-27 ENCOUNTER — HOSPITAL ENCOUNTER (OUTPATIENT)
Dept: GENERAL RADIOLOGY | Age: 67
Discharge: HOME | End: 2022-10-27
Attending: INTERNAL MEDICINE
Payer: MEDICARE

## 2022-10-27 ENCOUNTER — LAB ENCOUNTER (OUTPATIENT)
Dept: LAB | Age: 67
End: 2022-10-27
Attending: INTERNAL MEDICINE
Payer: MEDICARE

## 2022-10-27 ENCOUNTER — OFFICE VISIT (OUTPATIENT)
Dept: INTERNAL MEDICINE CLINIC | Facility: CLINIC | Age: 67
End: 2022-10-27
Payer: MEDICARE

## 2022-10-27 VITALS
SYSTOLIC BLOOD PRESSURE: 116 MMHG | WEIGHT: 213.13 LBS | DIASTOLIC BLOOD PRESSURE: 68 MMHG | OXYGEN SATURATION: 96 % | HEIGHT: 63 IN | BODY MASS INDEX: 37.76 KG/M2 | HEART RATE: 61 BPM | TEMPERATURE: 98 F

## 2022-10-27 DIAGNOSIS — E55.9 VITAMIN D DEFICIENCY: ICD-10-CM

## 2022-10-27 DIAGNOSIS — M40.204 KYPHOSIS OF THORACIC REGION, UNSPECIFIED KYPHOSIS TYPE: ICD-10-CM

## 2022-10-27 DIAGNOSIS — M85.89 OSTEOPENIA OF MULTIPLE SITES: ICD-10-CM

## 2022-10-27 DIAGNOSIS — E78.00 PURE HYPERCHOLESTEROLEMIA: ICD-10-CM

## 2022-10-27 DIAGNOSIS — R05.1 ACUTE COUGH: ICD-10-CM

## 2022-10-27 DIAGNOSIS — M25.562 ACUTE PAIN OF LEFT KNEE: ICD-10-CM

## 2022-10-27 DIAGNOSIS — Z12.31 ENCOUNTER FOR SCREENING MAMMOGRAM FOR BREAST CANCER: ICD-10-CM

## 2022-10-27 DIAGNOSIS — Z00.00 MEDICARE ANNUAL WELLNESS VISIT, SUBSEQUENT: Primary | ICD-10-CM

## 2022-10-27 DIAGNOSIS — E04.1 THYROID NODULE: ICD-10-CM

## 2022-10-27 LAB — SARS-COV-2 RNA RESP QL NAA+PROBE: NOT DETECTED

## 2022-10-27 PROCEDURE — 72072 X-RAY EXAM THORAC SPINE 3VWS: CPT | Performed by: INTERNAL MEDICINE

## 2022-10-27 PROCEDURE — 73560 X-RAY EXAM OF KNEE 1 OR 2: CPT | Performed by: INTERNAL MEDICINE

## 2022-10-27 RX ORDER — CODEINE PHOSPHATE AND GUAIFENESIN 10; 100 MG/5ML; MG/5ML
5 SOLUTION ORAL EVERY 6 HOURS PRN
Qty: 120 ML | Refills: 0 | Status: SHIPPED | OUTPATIENT
Start: 2022-10-27

## 2022-10-28 ENCOUNTER — TELEPHONE (OUTPATIENT)
Dept: INTERNAL MEDICINE CLINIC | Facility: CLINIC | Age: 67
End: 2022-10-28

## 2022-10-30 PROBLEM — M25.562 ACUTE PAIN OF LEFT KNEE: Status: ACTIVE | Noted: 2022-10-30

## 2022-10-30 PROBLEM — M85.89 OSTEOPENIA OF MULTIPLE SITES: Status: ACTIVE | Noted: 2022-10-30

## 2022-10-30 PROBLEM — F32.A ANXIETY AND DEPRESSION: Status: RESOLVED | Noted: 2021-04-19 | Resolved: 2022-10-30

## 2022-10-30 PROBLEM — M40.204 KYPHOSIS OF THORACIC REGION: Status: ACTIVE | Noted: 2022-10-30

## 2022-10-30 PROBLEM — F41.9 ANXIETY AND DEPRESSION: Status: RESOLVED | Noted: 2021-04-19 | Resolved: 2022-10-30

## 2022-10-30 PROBLEM — R05.1 ACUTE COUGH: Status: ACTIVE | Noted: 2017-02-06

## 2022-11-22 ENCOUNTER — TELEPHONE (OUTPATIENT)
Dept: INTERNAL MEDICINE CLINIC | Facility: CLINIC | Age: 67
End: 2022-11-22

## 2022-11-22 DIAGNOSIS — M25.562 ACUTE PAIN OF LEFT KNEE: Primary | ICD-10-CM

## 2022-11-25 ENCOUNTER — HOSPITAL ENCOUNTER (OUTPATIENT)
Dept: ULTRASOUND IMAGING | Facility: HOSPITAL | Age: 67
Discharge: HOME | End: 2022-11-25
Attending: INTERNAL MEDICINE
Payer: MEDICARE

## 2022-11-25 ENCOUNTER — HOSPITAL ENCOUNTER (OUTPATIENT)
Dept: MAMMOGRAPHY | Facility: HOSPITAL | Age: 67
Discharge: HOME | End: 2022-11-25
Attending: INTERNAL MEDICINE
Payer: MEDICARE

## 2022-11-25 DIAGNOSIS — R92.8 ABNORMAL MAMMOGRAM: ICD-10-CM

## 2022-11-25 PROCEDURE — 77062 BREAST TOMOSYNTHESIS BI: CPT | Performed by: INTERNAL MEDICINE

## 2022-11-25 PROCEDURE — 76642 ULTRASOUND BREAST LIMITED: CPT | Performed by: INTERNAL MEDICINE

## 2022-11-25 PROCEDURE — 77066 DX MAMMO INCL CAD BI: CPT | Performed by: INTERNAL MEDICINE

## 2022-11-28 ENCOUNTER — OFFICE VISIT (OUTPATIENT)
Dept: RHEUMATOLOGY | Facility: CLINIC | Age: 67
End: 2022-11-28
Payer: MEDICARE

## 2022-11-28 ENCOUNTER — LAB ENCOUNTER (OUTPATIENT)
Dept: LAB | Facility: HOSPITAL | Age: 67
End: 2022-11-28
Attending: INTERNAL MEDICINE
Payer: MEDICARE

## 2022-11-28 VITALS
DIASTOLIC BLOOD PRESSURE: 83 MMHG | WEIGHT: 217 LBS | HEART RATE: 60 BPM | HEIGHT: 63 IN | SYSTOLIC BLOOD PRESSURE: 127 MMHG | BODY MASS INDEX: 38.45 KG/M2

## 2022-11-28 DIAGNOSIS — N60.02 BILATERAL BREAST CYSTS: Primary | ICD-10-CM

## 2022-11-28 DIAGNOSIS — N60.01 BILATERAL BREAST CYSTS: Primary | ICD-10-CM

## 2022-11-28 DIAGNOSIS — M80.00XA AGE-RELATED OSTEOPOROSIS WITH CURRENT PATHOLOGICAL FRACTURE, INITIAL ENCOUNTER: Primary | ICD-10-CM

## 2022-11-28 DIAGNOSIS — M80.00XA AGE-RELATED OSTEOPOROSIS WITH CURRENT PATHOLOGICAL FRACTURE, INITIAL ENCOUNTER: ICD-10-CM

## 2022-11-28 LAB
ALP LIVER SERPL-CCNC: 109 U/L
ANION GAP SERPL CALC-SCNC: 5 MMOL/L (ref 0–18)
BUN BLD-MCNC: 15 MG/DL (ref 7–18)
BUN/CREAT SERPL: 31.3 (ref 10–20)
CALCIUM BLD-MCNC: 9.9 MG/DL (ref 8.5–10.1)
CHLORIDE SERPL-SCNC: 105 MMOL/L (ref 98–112)
CO2 SERPL-SCNC: 27 MMOL/L (ref 21–32)
CREAT BLD-MCNC: 0.48 MG/DL
FASTING STATUS PATIENT QL REPORTED: NO
GFR SERPLBLD BASED ON 1.73 SQ M-ARVRAT: 104 ML/MIN/1.73M2 (ref 60–?)
GLUCOSE BLD-MCNC: 90 MG/DL (ref 70–99)
OSMOLALITY SERPL CALC.SUM OF ELEC: 284 MOSM/KG (ref 275–295)
POTASSIUM SERPL-SCNC: 4 MMOL/L (ref 3.5–5.1)
PTH-INTACT SERPL-MCNC: 41.9 PG/ML (ref 18.5–88)
SODIUM SERPL-SCNC: 137 MMOL/L (ref 136–145)
VIT D+METAB SERPL-MCNC: 40.3 NG/ML (ref 30–100)

## 2022-11-28 PROCEDURE — 84075 ASSAY ALKALINE PHOSPHATASE: CPT

## 2022-11-28 PROCEDURE — 83970 ASSAY OF PARATHORMONE: CPT

## 2022-11-28 PROCEDURE — 80048 BASIC METABOLIC PNL TOTAL CA: CPT

## 2022-11-28 PROCEDURE — 99204 OFFICE O/P NEW MOD 45 MIN: CPT | Performed by: INTERNAL MEDICINE

## 2022-11-28 PROCEDURE — 83521 IG LIGHT CHAINS FREE EACH: CPT

## 2022-11-28 PROCEDURE — 84165 PROTEIN E-PHORESIS SERUM: CPT

## 2022-11-28 PROCEDURE — 36415 COLL VENOUS BLD VENIPUNCTURE: CPT

## 2022-11-28 PROCEDURE — 86334 IMMUNOFIX E-PHORESIS SERUM: CPT

## 2022-11-28 PROCEDURE — 82306 VITAMIN D 25 HYDROXY: CPT

## 2022-11-28 RX ORDER — ALENDRONATE SODIUM 70 MG/1
70 TABLET ORAL WEEKLY
Qty: 12 TABLET | Refills: 2 | Status: SHIPPED | OUTPATIENT
Start: 2022-11-28

## 2022-11-28 NOTE — PATIENT INSTRUCTIONS
You were seen today for curvature of your spine due to kyphosis which is from osteoporosis  Plan to start you on Fosamax weekly for treatment of osteoporosis, you will be on this medication for at least 4 years  Plan to repeat bone density in 2 years  Continue calcium 600 mg daily and vitamin D supplement 1000 units daily  Blood work today  Follow-up in the next 6 months to a year to continue to monitor your osteoporosis  Also refer you to physical therapy

## 2022-11-29 LAB
ALBUMIN SERPL ELPH-MCNC: 4.08 G/DL (ref 3.75–5.21)
ALBUMIN/GLOB SERPL: 1.27 {RATIO} (ref 1–2)
ALPHA1 GLOB SERPL ELPH-MCNC: 0.4 G/DL (ref 0.19–0.46)
ALPHA2 GLOB SERPL ELPH-MCNC: 0.94 G/DL (ref 0.48–1.05)
B-GLOBULIN SERPL ELPH-MCNC: 0.91 G/DL (ref 0.68–1.23)
GAMMA GLOB SERPL ELPH-MCNC: 0.96 G/DL (ref 0.62–1.7)
KAPPA LC FREE SER-MCNC: 2.89 MG/DL (ref 0.33–1.94)
KAPPA LC FREE/LAMBDA FREE SER NEPH: 2.42 {RATIO} (ref 0.26–1.65)
LAMBDA LC FREE SERPL-MCNC: 1.19 MG/DL (ref 0.57–2.63)
PROT SERPL-MCNC: 7.3 G/DL (ref 6.4–8.2)

## 2022-12-02 ENCOUNTER — TELEPHONE (OUTPATIENT)
Dept: INTERNAL MEDICINE CLINIC | Facility: CLINIC | Age: 67
End: 2022-12-02

## 2022-12-02 RX ORDER — ALPRAZOLAM 0.5 MG/1
TABLET ORAL
Qty: 2 TABLET | Refills: 0 | Status: SHIPPED | OUTPATIENT
Start: 2022-12-02

## 2022-12-02 NOTE — TELEPHONE ENCOUNTER
Dr. Gunnar Davenport Pt is having a MRI done on 12/7 and she is claustrophobic. Pt will like to know if there is a medication that you can prescribe to help her relax. Please Advise             Future Appointments   Date Time Provider Marquise Soto   12/7/2022  1:45 PM [facility] RM1 (1.5T WIDE) [facility] EM [place]

## 2022-12-02 NOTE — TELEPHONE ENCOUNTER
Ok to giver her xanax 0.5mg , take 1 to 2 tabs po 30 min before mri. Pt should not drive. Present, accurate, and signed

## 2022-12-07 ENCOUNTER — HOSPITAL ENCOUNTER (OUTPATIENT)
Dept: MRI IMAGING | Age: 67
Discharge: HOME | End: 2022-12-07
Attending: INTERNAL MEDICINE
Payer: MEDICARE

## 2022-12-07 DIAGNOSIS — M25.562 ACUTE PAIN OF LEFT KNEE: ICD-10-CM

## 2022-12-07 PROCEDURE — 82565 ASSAY OF CREATININE: CPT

## 2022-12-07 PROCEDURE — 73721 MRI JNT OF LWR EXTRE W/O DYE: CPT | Performed by: INTERNAL MEDICINE

## 2022-12-09 ENCOUNTER — TELEPHONE (OUTPATIENT)
Dept: INTERNAL MEDICINE CLINIC | Facility: CLINIC | Age: 67
End: 2022-12-09

## 2022-12-09 DIAGNOSIS — R93.6 ABNORMAL MRI, KNEE: Primary | ICD-10-CM

## 2022-12-09 NOTE — TELEPHONE ENCOUNTER
No answer at tel#[phone], lmtcb to discuss mri results.     Please reply to pool: JAMEEL Chavez

## 2022-12-09 NOTE — TELEPHONE ENCOUNTER
Her mmri knee showed arthritis of knee, there are also meniscal cartilage tear as well a ganglion cyst  She needs to see ortho and we can refer to Dr Walter Rondon and partners.

## 2022-12-09 NOTE — TELEPHONE ENCOUNTER
Pt inquiring on Left knee mri results. Advised pt not to do strenuous activity or exercises. Rest the leg. Tasked to Dr Olivia Ellis to advise further.     Please reply to pool: JAMEEL Serrato Channel

## 2022-12-23 ENCOUNTER — IMMUNIZATION (OUTPATIENT)
Dept: INTERNAL MEDICINE CLINIC | Facility: CLINIC | Age: 67
End: 2022-12-23
Payer: MEDICARE

## 2022-12-23 DIAGNOSIS — Z23 NEED FOR VACCINATION: Primary | ICD-10-CM

## 2022-12-23 PROCEDURE — 90662 IIV NO PRSV INCREASED AG IM: CPT | Performed by: INTERNAL MEDICINE

## 2022-12-23 PROCEDURE — G0008 ADMIN INFLUENZA VIRUS VAC: HCPCS | Performed by: INTERNAL MEDICINE

## 2023-01-04 ENCOUNTER — OFFICE VISIT (OUTPATIENT)
Dept: ORTHOPEDICS CLINIC | Facility: CLINIC | Age: 68
End: 2023-01-04
Payer: MEDICARE

## 2023-01-04 VITALS
HEART RATE: 56 BPM | DIASTOLIC BLOOD PRESSURE: 77 MMHG | WEIGHT: 219 LBS | HEIGHT: 63 IN | BODY MASS INDEX: 38.8 KG/M2 | SYSTOLIC BLOOD PRESSURE: 133 MMHG

## 2023-01-04 DIAGNOSIS — M17.12 PRIMARY OSTEOARTHRITIS OF LEFT KNEE: Primary | ICD-10-CM

## 2023-01-04 DIAGNOSIS — E66.9 OBESITY (BMI 30-39.9): ICD-10-CM

## 2023-01-04 RX ORDER — TRIAMCINOLONE ACETONIDE 40 MG/ML
40 INJECTION, SUSPENSION INTRA-ARTICULAR; INTRAMUSCULAR ONCE
Status: COMPLETED | OUTPATIENT
Start: 2023-01-04 | End: 2023-01-04

## 2023-01-04 RX ORDER — MELOXICAM 15 MG/1
15 TABLET ORAL DAILY
Qty: 30 TABLET | Refills: 0 | Status: SHIPPED | OUTPATIENT
Start: 2023-01-04

## 2023-01-04 RX ADMIN — TRIAMCINOLONE ACETONIDE 40 MG: 40 INJECTION, SUSPENSION INTRA-ARTICULAR; INTRAMUSCULAR at 12:15:00

## 2023-01-05 NOTE — ADDENDUM NOTE
Addended by: Rachid Black on: 1/4/2023 06:52 PM     Modules accepted: Orders, Level of Service, SmartSet

## 2023-01-09 ENCOUNTER — APPOINTMENT (OUTPATIENT)
Dept: PHYSICAL THERAPY | Age: 68
End: 2023-01-09
Attending: ORTHOPAEDIC SURGERY
Payer: MEDICARE

## 2023-01-11 ENCOUNTER — OFFICE VISIT (OUTPATIENT)
Dept: PHYSICAL THERAPY | Age: 68
End: 2023-01-11
Attending: ORTHOPAEDIC SURGERY
Payer: MEDICARE

## 2023-01-11 DIAGNOSIS — M17.12 PRIMARY OSTEOARTHRITIS OF LEFT KNEE: ICD-10-CM

## 2023-01-11 PROCEDURE — 97161 PT EVAL LOW COMPLEX 20 MIN: CPT

## 2023-01-11 PROCEDURE — 97110 THERAPEUTIC EXERCISES: CPT

## 2023-01-18 ENCOUNTER — TELEPHONE (OUTPATIENT)
Dept: PHYSICAL THERAPY | Facility: HOSPITAL | Age: 68
End: 2023-01-18

## 2023-01-19 ENCOUNTER — APPOINTMENT (OUTPATIENT)
Dept: PHYSICAL THERAPY | Age: 68
End: 2023-01-19
Attending: ORTHOPAEDIC SURGERY
Payer: MEDICARE

## 2023-01-25 ENCOUNTER — TELEPHONE (OUTPATIENT)
Dept: PHYSICAL THERAPY | Facility: HOSPITAL | Age: 68
End: 2023-01-25

## 2023-01-25 ENCOUNTER — APPOINTMENT (OUTPATIENT)
Dept: PHYSICAL THERAPY | Age: 68
End: 2023-01-25
Attending: ORTHOPAEDIC SURGERY
Payer: MEDICARE

## 2023-01-31 ENCOUNTER — OFFICE VISIT (OUTPATIENT)
Dept: PHYSICAL THERAPY | Age: 68
End: 2023-01-31
Attending: ORTHOPAEDIC SURGERY
Payer: MEDICARE

## 2023-01-31 PROCEDURE — 97110 THERAPEUTIC EXERCISES: CPT

## 2023-01-31 NOTE — PROGRESS NOTES
Diagnosis:   Primary osteoarthritis of left knee (M17.12)    Next MD visit: none scheduled  Fall Risk: standard         Precautions: n/a          Medication Changes since last visit?: No    Subjective: Pt reports no pain, but weakness and/or tightness and like she cant move much. She states she may be going to [place] next week for a few months, but is not sure yet. Objective:     Date: 1/31/2023  Visit #: 2/10 (medicare/aetna supplement)   HEP   - updated HEP - see pt instructions section   Therapeutic Exercise   - supine L QS 10x 5 sec holds  - supine R/L hamstring stretch with strap 2 x 30 sec holds  - hooklying R/L hip abd/ER with GTB 1 x 10 ea 5 sec holds  - supine R/L SLR 2 x 10 ea  - supine R/L SLR with RTB above knees 2 x 5 reps each  - supine B knee flexion AAROM with SB 2 x 10  - supine bridges with RTB above knees 1 x 10  - sidelying R/L clams with RTB above knees 1 x 10 ea  - standing B gastroc stretch on slant board level 4 3 x 30 sec holds  - standing B heel raises on slant board 1 x 10  - standing lumbar extension 10x   Manual Therapy   -   Therapeutic Activity   -   Modalities                Assessment: Session focused on strengthening of quadriceps and hip musculature.       Plan: continue PT    Charges: Ex 3       Total Timed Treatment: 50 min  Total Treatment Time: 50 min

## 2023-02-08 ENCOUNTER — APPOINTMENT (OUTPATIENT)
Dept: PHYSICAL THERAPY | Age: 68
End: 2023-02-08
Attending: ORTHOPAEDIC SURGERY
Payer: MEDICARE

## 2023-02-10 ENCOUNTER — NURSE TRIAGE (OUTPATIENT)
Dept: INTERNAL MEDICINE CLINIC | Facility: CLINIC | Age: 68
End: 2023-02-10

## 2023-02-10 NOTE — TELEPHONE ENCOUNTER
Spoke to patient and relayed Dr. Alissa Spear message below. Patient verbalized understanding and had no further questions.

## 2023-02-13 ENCOUNTER — APPOINTMENT (OUTPATIENT)
Dept: PHYSICAL THERAPY | Age: 68
End: 2023-02-13
Attending: INTERNAL MEDICINE
Payer: MEDICARE

## 2023-02-15 ENCOUNTER — APPOINTMENT (OUTPATIENT)
Dept: PHYSICAL THERAPY | Age: 68
End: 2023-02-15
Attending: INTERNAL MEDICINE
Payer: MEDICARE

## 2023-02-20 ENCOUNTER — APPOINTMENT (OUTPATIENT)
Dept: PHYSICAL THERAPY | Age: 68
End: 2023-02-20
Attending: INTERNAL MEDICINE
Payer: MEDICARE

## 2023-02-22 ENCOUNTER — APPOINTMENT (OUTPATIENT)
Dept: PHYSICAL THERAPY | Age: 68
End: 2023-02-22
Attending: INTERNAL MEDICINE
Payer: MEDICARE

## 2023-02-27 ENCOUNTER — APPOINTMENT (OUTPATIENT)
Dept: PHYSICAL THERAPY | Age: 68
End: 2023-02-27
Attending: INTERNAL MEDICINE
Payer: MEDICARE

## 2023-03-01 ENCOUNTER — APPOINTMENT (OUTPATIENT)
Dept: PHYSICAL THERAPY | Age: 68
End: 2023-03-01
Attending: INTERNAL MEDICINE
Payer: MEDICARE

## 2023-03-09 ENCOUNTER — NURSE TRIAGE (OUTPATIENT)
Dept: INTERNAL MEDICINE CLINIC | Facility: CLINIC | Age: 68
End: 2023-03-09

## 2023-03-09 NOTE — TELEPHONE ENCOUNTER
I would recommend seeing local doctor first there so they can examine her breast then they can decide if pt will need to get the mammogram/US there.

## 2023-04-06 ENCOUNTER — TELEPHONE (OUTPATIENT)
Dept: INTERNAL MEDICINE CLINIC | Facility: CLINIC | Age: 68
End: 2023-04-06

## 2023-04-11 ENCOUNTER — LAB ENCOUNTER (OUTPATIENT)
Dept: LAB | Age: 68
End: 2023-04-11
Attending: INTERNAL MEDICINE
Payer: MEDICARE

## 2023-04-11 ENCOUNTER — OFFICE VISIT (OUTPATIENT)
Dept: INTERNAL MEDICINE CLINIC | Facility: CLINIC | Age: 68
End: 2023-04-11

## 2023-04-11 VITALS
DIASTOLIC BLOOD PRESSURE: 76 MMHG | SYSTOLIC BLOOD PRESSURE: 127 MMHG | HEART RATE: 60 BPM | OXYGEN SATURATION: 96 % | TEMPERATURE: 99 F | HEIGHT: 63 IN | WEIGHT: 222.31 LBS | BODY MASS INDEX: 39.39 KG/M2

## 2023-04-11 DIAGNOSIS — M25.562 CHRONIC PAIN OF LEFT KNEE: ICD-10-CM

## 2023-04-11 DIAGNOSIS — N64.4 BREAST PAIN, RIGHT: Primary | ICD-10-CM

## 2023-04-11 DIAGNOSIS — R92.8 ABNORMAL MAMMOGRAM: ICD-10-CM

## 2023-04-11 DIAGNOSIS — E55.9 VITAMIN D DEFICIENCY: ICD-10-CM

## 2023-04-11 DIAGNOSIS — Z00.00 MEDICARE ANNUAL WELLNESS VISIT, SUBSEQUENT: ICD-10-CM

## 2023-04-11 DIAGNOSIS — G89.29 CHRONIC PAIN OF LEFT KNEE: ICD-10-CM

## 2023-04-11 DIAGNOSIS — E04.1 THYROID NODULE: ICD-10-CM

## 2023-04-11 DIAGNOSIS — E78.00 PURE HYPERCHOLESTEROLEMIA: ICD-10-CM

## 2023-04-11 LAB
ALBUMIN SERPL-MCNC: 3.2 G/DL (ref 3.4–5)
ALBUMIN/GLOB SERPL: 0.9 {RATIO} (ref 1–2)
ALP LIVER SERPL-CCNC: 88 U/L
ALT SERPL-CCNC: 24 U/L
ANION GAP SERPL CALC-SCNC: 5 MMOL/L (ref 0–18)
AST SERPL-CCNC: 11 U/L (ref 15–37)
BASOPHILS # BLD AUTO: 0.03 X10(3) UL (ref 0–0.2)
BASOPHILS NFR BLD AUTO: 0.4 %
BILIRUB SERPL-MCNC: 0.4 MG/DL (ref 0.1–2)
BUN BLD-MCNC: 10 MG/DL (ref 7–18)
BUN/CREAT SERPL: 22.2 (ref 10–20)
CALCIUM BLD-MCNC: 9.1 MG/DL (ref 8.5–10.1)
CHLORIDE SERPL-SCNC: 109 MMOL/L (ref 98–112)
CHOLEST SERPL-MCNC: 192 MG/DL (ref ?–200)
CO2 SERPL-SCNC: 27 MMOL/L (ref 21–32)
CREAT BLD-MCNC: 0.45 MG/DL
DEPRECATED RDW RBC AUTO: 47.4 FL (ref 35.1–46.3)
EOSINOPHIL # BLD AUTO: 0.2 X10(3) UL (ref 0–0.7)
EOSINOPHIL NFR BLD AUTO: 2.7 %
ERYTHROCYTE [DISTWIDTH] IN BLOOD BY AUTOMATED COUNT: 15.7 % (ref 11–15)
FASTING PATIENT LIPID ANSWER: YES
FASTING STATUS PATIENT QL REPORTED: YES
GFR SERPLBLD BASED ON 1.73 SQ M-ARVRAT: 105 ML/MIN/1.73M2 (ref 60–?)
GLOBULIN PLAS-MCNC: 3.5 G/DL (ref 2.8–4.4)
GLUCOSE BLD-MCNC: 94 MG/DL (ref 70–99)
HCT VFR BLD AUTO: 42.2 %
HDLC SERPL-MCNC: 77 MG/DL (ref 40–59)
HGB BLD-MCNC: 13.1 G/DL
IMM GRANULOCYTES # BLD AUTO: 0.05 X10(3) UL (ref 0–1)
IMM GRANULOCYTES NFR BLD: 0.7 %
LDLC SERPL CALC-MCNC: 97 MG/DL (ref ?–100)
LYMPHOCYTES # BLD AUTO: 2.48 X10(3) UL (ref 1–4)
LYMPHOCYTES NFR BLD AUTO: 33.3 %
MCH RBC QN AUTO: 25.4 PG (ref 26–34)
MCHC RBC AUTO-ENTMCNC: 31 G/DL (ref 31–37)
MCV RBC AUTO: 81.9 FL
MONOCYTES # BLD AUTO: 0.44 X10(3) UL (ref 0.1–1)
MONOCYTES NFR BLD AUTO: 5.9 %
NEUTROPHILS # BLD AUTO: 4.24 X10 (3) UL (ref 1.5–7.7)
NEUTROPHILS # BLD AUTO: 4.24 X10(3) UL (ref 1.5–7.7)
NEUTROPHILS NFR BLD AUTO: 57 %
NONHDLC SERPL-MCNC: 115 MG/DL (ref ?–130)
OSMOLALITY SERPL CALC.SUM OF ELEC: 291 MOSM/KG (ref 275–295)
PLATELET # BLD AUTO: 271 10(3)UL (ref 150–450)
POTASSIUM SERPL-SCNC: 4.1 MMOL/L (ref 3.5–5.1)
PROT SERPL-MCNC: 6.7 G/DL (ref 6.4–8.2)
RBC # BLD AUTO: 5.15 X10(6)UL
SODIUM SERPL-SCNC: 141 MMOL/L (ref 136–145)
TRIGL SERPL-MCNC: 104 MG/DL (ref 30–149)
TSI SER-ACNC: 0.52 MIU/ML (ref 0.36–3.74)
VIT D+METAB SERPL-MCNC: 38.2 NG/ML (ref 30–100)
VLDLC SERPL CALC-MCNC: 17 MG/DL (ref 0–30)
WBC # BLD AUTO: 7.4 X10(3) UL (ref 4–11)

## 2023-04-11 PROCEDURE — 36415 COLL VENOUS BLD VENIPUNCTURE: CPT

## 2023-04-11 PROCEDURE — 85025 COMPLETE CBC W/AUTO DIFF WBC: CPT

## 2023-04-11 PROCEDURE — 82306 VITAMIN D 25 HYDROXY: CPT

## 2023-04-11 PROCEDURE — 84443 ASSAY THYROID STIM HORMONE: CPT

## 2023-04-11 PROCEDURE — 99214 OFFICE O/P EST MOD 30 MIN: CPT | Performed by: INTERNAL MEDICINE

## 2023-04-11 PROCEDURE — 80053 COMPREHEN METABOLIC PANEL: CPT

## 2023-04-11 PROCEDURE — 80061 LIPID PANEL: CPT

## 2023-04-11 RX ORDER — CA/D3/MAG OX/ZINC/COP/MANG/BOR 600 MG-800
TABLET,CHEWABLE ORAL
COMMUNITY

## 2023-04-29 ENCOUNTER — TELEPHONE (OUTPATIENT)
Dept: INTERNAL MEDICINE CLINIC | Facility: CLINIC | Age: 68
End: 2023-04-29

## 2023-04-29 NOTE — TELEPHONE ENCOUNTER
[facility] PT plan of care 04/27/2023 placed on Dr. Stephanie Garcia deslee for signature and review.

## 2023-05-01 ENCOUNTER — HOSPITAL ENCOUNTER (OUTPATIENT)
Dept: ULTRASOUND IMAGING | Facility: HOSPITAL | Age: 68
Discharge: HOME | End: 2023-05-01
Attending: INTERNAL MEDICINE
Payer: MEDICARE

## 2023-05-01 ENCOUNTER — HOSPITAL ENCOUNTER (OUTPATIENT)
Dept: MAMMOGRAPHY | Facility: HOSPITAL | Age: 68
Discharge: HOME | End: 2023-05-01
Attending: INTERNAL MEDICINE
Payer: MEDICARE

## 2023-05-01 DIAGNOSIS — N64.4 BREAST PAIN, RIGHT: ICD-10-CM

## 2023-05-01 DIAGNOSIS — R92.8 ABNORMAL MAMMOGRAM: ICD-10-CM

## 2023-05-01 PROCEDURE — 77062 BREAST TOMOSYNTHESIS BI: CPT | Performed by: INTERNAL MEDICINE

## 2023-05-01 PROCEDURE — 76642 ULTRASOUND BREAST LIMITED: CPT | Performed by: INTERNAL MEDICINE

## 2023-05-01 PROCEDURE — 77066 DX MAMMO INCL CAD BI: CPT | Performed by: INTERNAL MEDICINE

## 2023-05-02 ENCOUNTER — MED REC SCAN ONLY (OUTPATIENT)
Dept: INTERNAL MEDICINE CLINIC | Facility: CLINIC | Age: 68
End: 2023-05-02

## 2023-05-03 ENCOUNTER — TELEPHONE (OUTPATIENT)
Dept: INTERNAL MEDICINE CLINIC | Facility: CLINIC | Age: 68
End: 2023-05-03

## 2023-05-03 DIAGNOSIS — N64.4 BREAST PAIN, RIGHT: Primary | ICD-10-CM

## 2023-05-05 ENCOUNTER — OFFICE VISIT (OUTPATIENT)
Dept: ORTHOPEDICS CLINIC | Facility: CLINIC | Age: 68
End: 2023-05-05

## 2023-05-05 VITALS — DIASTOLIC BLOOD PRESSURE: 81 MMHG | HEART RATE: 70 BPM | SYSTOLIC BLOOD PRESSURE: 122 MMHG

## 2023-05-05 DIAGNOSIS — M17.12 PRIMARY OSTEOARTHRITIS OF LEFT KNEE: Primary | ICD-10-CM

## 2023-05-05 DIAGNOSIS — E66.9 OBESITY (BMI 30-39.9): ICD-10-CM

## 2023-05-05 RX ORDER — TRIAMCINOLONE ACETONIDE 40 MG/ML
40 INJECTION, SUSPENSION INTRA-ARTICULAR; INTRAMUSCULAR ONCE
Status: SHIPPED | OUTPATIENT
Start: 2023-05-05

## 2023-05-05 NOTE — PROGRESS NOTES
Per verbal order from Dr. Abhinav Espinoza, draw up and 4ml of 0.5% Marcaine and 1ml of Kenalog 40 for injection into left knee. Leroy Schmidt RN  Patient provided education handout for cortisone injection.

## 2023-05-05 NOTE — ADDENDUM NOTE
Addended by: Jessica Sanz on: 5/5/2023 05:09 PM     Modules accepted: Orders, Level of Service, SmartSet

## 2023-08-03 ENCOUNTER — HOSPITAL ENCOUNTER (OUTPATIENT)
Dept: GENERAL RADIOLOGY | Age: 68
Discharge: HOME | End: 2023-08-03
Attending: INTERNAL MEDICINE
Payer: MEDICARE

## 2023-08-03 ENCOUNTER — OFFICE VISIT (OUTPATIENT)
Dept: INTERNAL MEDICINE CLINIC | Facility: CLINIC | Age: 68
End: 2023-08-03

## 2023-08-03 VITALS
BODY MASS INDEX: 39.69 KG/M2 | DIASTOLIC BLOOD PRESSURE: 79 MMHG | SYSTOLIC BLOOD PRESSURE: 135 MMHG | WEIGHT: 224 LBS | HEIGHT: 63 IN | HEART RATE: 67 BPM

## 2023-08-03 DIAGNOSIS — M79.601 RIGHT ARM PAIN: Primary | ICD-10-CM

## 2023-08-03 DIAGNOSIS — M25.511 ACUTE PAIN OF RIGHT SHOULDER: ICD-10-CM

## 2023-08-03 DIAGNOSIS — M79.601 RIGHT ARM PAIN: ICD-10-CM

## 2023-08-03 DIAGNOSIS — G62.9 NEUROPATHY: ICD-10-CM

## 2023-08-03 DIAGNOSIS — M62.838 MUSCLE SPASM OF LEFT SHOULDER: ICD-10-CM

## 2023-08-03 PROCEDURE — 73030 X-RAY EXAM OF SHOULDER: CPT | Performed by: INTERNAL MEDICINE

## 2023-08-03 PROCEDURE — A4565 SLINGS: HCPCS | Performed by: INTERNAL MEDICINE

## 2023-08-03 PROCEDURE — 99214 OFFICE O/P EST MOD 30 MIN: CPT | Performed by: INTERNAL MEDICINE

## 2023-08-03 PROCEDURE — 73060 X-RAY EXAM OF HUMERUS: CPT | Performed by: INTERNAL MEDICINE

## 2023-08-03 RX ORDER — HYDROCODONE BITARTRATE AND ACETAMINOPHEN 5; 325 MG/1; MG/1
1 TABLET ORAL 2 TIMES DAILY PRN
Qty: 12 TABLET | Refills: 0 | Status: SHIPPED | OUTPATIENT
Start: 2023-08-03

## 2023-08-03 RX ORDER — PREDNISONE 10 MG/1
TABLET ORAL
Qty: 18 TABLET | Refills: 0 | Status: SHIPPED | OUTPATIENT
Start: 2023-08-03 | End: 2023-08-11

## 2023-08-03 RX ORDER — CYCLOBENZAPRINE HCL 5 MG
TABLET ORAL 3 TIMES DAILY PRN
Qty: 90 TABLET | Refills: 1 | Status: SHIPPED | OUTPATIENT
Start: 2023-08-03

## 2023-08-03 NOTE — PATIENT INSTRUCTIONS
1.  Please start taking prednisone as directed on the bottle, take this with food, this is a tapering dose to reduce the inflammation and pain of your nerves. 2.  I have given you cyclobenzaprine known as Flexeril, this is a muscle relaxer, the instructions say take 1 to 2 tablets however you may start with 1/2 tablet to 1 tablet and see how things progress, it can be taken upwards of 3 times per day but I would recommend starting at night to see how you do or if you are at home using it then, it will make you feel drowsy like you have taken Benadryl. 3.  In the event you have already taken the prednisone and the cyclobenzaprine and your pain is still severe you may then take a Norco i.e. the hydrocodone to alleviate your acute pain, this will cause you to be sedated and drowsy therefore you should avoid taking the hydrocodone and the cyclobenzaprine at the same time but if there is a 1 hour difference between the 2 you may then take the pills.

## 2023-08-14 ENCOUNTER — PATIENT MESSAGE (OUTPATIENT)
Dept: RHEUMATOLOGY | Facility: CLINIC | Age: 68
End: 2023-08-14

## 2023-08-14 ENCOUNTER — HOSPITAL ENCOUNTER (OUTPATIENT)
Dept: GENERAL RADIOLOGY | Facility: HOSPITAL | Age: 68
Discharge: HOME | End: 2023-08-14
Attending: ORTHOPAEDIC SURGERY
Payer: MEDICARE

## 2023-08-14 ENCOUNTER — OFFICE VISIT (OUTPATIENT)
Dept: ORTHOPEDICS CLINIC | Facility: CLINIC | Age: 68
End: 2023-08-14

## 2023-08-14 VITALS
WEIGHT: 220 LBS | HEIGHT: 63 IN | DIASTOLIC BLOOD PRESSURE: 84 MMHG | HEART RATE: 72 BPM | SYSTOLIC BLOOD PRESSURE: 135 MMHG | BODY MASS INDEX: 38.98 KG/M2

## 2023-08-14 DIAGNOSIS — R52 PAIN: ICD-10-CM

## 2023-08-14 DIAGNOSIS — R52 PAIN: Primary | ICD-10-CM

## 2023-08-14 DIAGNOSIS — E66.01 MORBID OBESITY (HCC): ICD-10-CM

## 2023-08-14 DIAGNOSIS — M80.00XA AGE-RELATED OSTEOPOROSIS WITH CURRENT PATHOLOGICAL FRACTURE, INITIAL ENCOUNTER: Primary | ICD-10-CM

## 2023-08-14 DIAGNOSIS — M17.12 PRIMARY OSTEOARTHRITIS OF LEFT KNEE: ICD-10-CM

## 2023-08-14 PROCEDURE — 73562 X-RAY EXAM OF KNEE 3: CPT | Performed by: ORTHOPAEDIC SURGERY

## 2023-08-14 RX ORDER — TRIAMCINOLONE ACETONIDE 40 MG/ML
40 INJECTION, SUSPENSION INTRA-ARTICULAR; INTRAMUSCULAR ONCE
Status: COMPLETED | OUTPATIENT
Start: 2023-08-14 | End: 2023-08-14

## 2023-08-14 RX ADMIN — TRIAMCINOLONE ACETONIDE 40 MG: 40 INJECTION, SUSPENSION INTRA-ARTICULAR; INTRAMUSCULAR at 13:53:00

## 2023-08-14 NOTE — TELEPHONE ENCOUNTER
Please advise Dr. William Morel.   LOV:  11/28/22  Future Appointments   Date Time Provider Marquise Soto   10/30/2023  1:00 PM Autumn Murguia MD [facility]   Instructions  You were seen today for curvature of your spine due to kyphosis which is from osteoporosis  Plan to start you on Fosamax weekly for treatment of osteoporosis, you will be on this medication for at least 4 years  Plan to repeat bone density in 2 years  Continue calcium 600 mg daily and vitamin D supplement 1000 units daily  Blood work today  Follow-up in the next 6 months to a year to continue to monitor your osteoporosis  Also refer you to physical therapy

## 2023-08-14 NOTE — PROGRESS NOTES
Per verbal order from Dr. Ray Head, draw up and 4ml of 0.5% Marcaine and 1ml of Kenalog 40 for injection into leftkneeRosalba H, MA  Patient provided education handout for cortisone injection.

## 2023-08-14 NOTE — TELEPHONE ENCOUNTER
From: Ryland Delgado  To: Tracey Armijo MD  Sent: 8/14/2023 2:09 PM CDT  Subject: Physical therapy     From our last visit would you be able to give me a script to do physical therapy for my osteoporosis? To help my posture.  Thank you so much

## 2023-10-10 ENCOUNTER — TELEPHONE (OUTPATIENT)
Dept: INTERNAL MEDICINE CLINIC | Facility: CLINIC | Age: 68
End: 2023-10-10

## 2023-10-10 DIAGNOSIS — Z12.11 SCREENING FOR COLON CANCER: ICD-10-CM

## 2023-10-10 DIAGNOSIS — R92.8 ABNORMAL MAMMOGRAM: Primary | ICD-10-CM

## 2023-10-10 NOTE — TELEPHONE ENCOUNTER
Orders signed; pt also due for her medicare physical by next month; she is also due for her screening colonoscpy.

## 2023-10-10 NOTE — TELEPHONE ENCOUNTER
Order pended, please edit/sign if appropriate    ( Diagnosis needed )     5/1/23  RECOMMENDATIONS:   SHORT TERM FOLLOW-UP DIAGNOSTIC MAMMOGRAM LEFT BREAST IN 6 MONTHS. SHORT TERM FOLLOW-UP ULTRASOUND LEFT BREAST IN 6 MONTHS.

## 2023-10-10 NOTE — TELEPHONE ENCOUNTER
Patient is trying to scheduled her 6 month follow up mammogram and no order available. Radiology requesting order for a Left breast mammogram with ultrasound. Please advise.

## 2023-10-16 ENCOUNTER — NURSE TRIAGE (OUTPATIENT)
Dept: INTERNAL MEDICINE CLINIC | Facility: CLINIC | Age: 68
End: 2023-10-16

## 2023-10-16 NOTE — TELEPHONE ENCOUNTER
Action Requested: Summary for Provider     []  Critical Lab, Recommendations Needed  [] Need Additional Advice  []   FYI    []   Need Orders  [] Need Medications Sent to Pharmacy  []  Other     SUMMARY: appt made 10/17/23 with PCP. Reason for call: Back Pain  Onset:   Has a nerve on lower federica RT side;  move a certain way. She is in process of moving. She notices hasn't been walking correctly due to knee pain. Patient needs evaluation. Appt made 10/17/23. With PCP.   Reason for Disposition   MODERATE back pain (e.g., interferes with normal activities) and present > 3 days    Protocols used: Back Pain-A-[place]

## 2023-10-17 ENCOUNTER — OFFICE VISIT (OUTPATIENT)
Dept: INTERNAL MEDICINE CLINIC | Facility: CLINIC | Age: 68
End: 2023-10-17

## 2023-10-17 VITALS
HEIGHT: 63 IN | HEART RATE: 76 BPM | DIASTOLIC BLOOD PRESSURE: 76 MMHG | TEMPERATURE: 98 F | WEIGHT: 225.88 LBS | SYSTOLIC BLOOD PRESSURE: 124 MMHG | OXYGEN SATURATION: 98 % | BODY MASS INDEX: 40.02 KG/M2

## 2023-10-17 DIAGNOSIS — S39.012A LOW BACK STRAIN, INITIAL ENCOUNTER: Primary | ICD-10-CM

## 2023-10-17 PROCEDURE — 99213 OFFICE O/P EST LOW 20 MIN: CPT | Performed by: INTERNAL MEDICINE

## 2023-10-17 PROCEDURE — G0008 ADMIN INFLUENZA VIRUS VAC: HCPCS | Performed by: INTERNAL MEDICINE

## 2023-10-17 PROCEDURE — 90662 IIV NO PRSV INCREASED AG IM: CPT | Performed by: INTERNAL MEDICINE

## 2023-10-17 NOTE — PROGRESS NOTES
Subjective:     Patient ID: Bandar Roque is a 76year old female. Low Back Pain  This is a new problem. The current episode started 1 to 4 weeks ago (2 weeks). The problem occurs intermittently. The problem has been gradually improving since onset. The pain is present in the lumbar spine. The quality of the pain is described as aching. The pain does not radiate. The pain is at a severity of 6/10. The pain is moderate. The pain is Worse during the day. The symptoms are aggravated by standing, bending and twisting. Stiffness is present All day. Pertinent negatives include no bladder incontinence, bowel incontinence, dysuria, fever, numbness, paresis, perianal numbness, tingling or weakness. Risk factors include obesity. She has tried NSAIDs and heat (massage) for the symptoms. The treatment provided no relief. History/Other:   Review of Systems   Constitutional: Negative. Negative for fever. Gastrointestinal: Negative. Negative for bowel incontinence. Genitourinary: Negative. Negative for bladder incontinence and dysuria. Musculoskeletal:  Positive for back pain. Neurological:  Negative for tingling, weakness and numbness. Current Outpatient Medications   Medication Sig Dispense Refill    cyclobenzaprine 5 MG Oral Tab Take 1-2 tablets (5-10 mg total) by mouth 3 (three) times daily as needed for Muscle spasms. May cause sedation; no driving. DO NOT TAKE WITH HYDROCODONE. 90 tablet 1    HYDROcodone-acetaminophen (NORCO) 5-325 MG Oral Tab Take 1 tablet by mouth 2 (two) times daily as needed for Pain (FOR SEVERE PAIN.). DO NOT TAKE WITH CYCLOBENZAPRINE. 12 tablet 0    CALCIUM OR Take by mouth. Probiotic Product (PROBIOTIC ADVANCED) Oral Cap Take by mouth. alendronate 70 MG Oral Tab Take 1 tablet (70 mg total) by mouth once a week. Take once weekly in AM, at least 30 minutes before the first food, beverage, or medication of the day.  12 tablet 2    Multiple Vitamin (MULTI-VITAMIN DAILY) Oral Tab Take 1 tablet by mouth daily. Omega-3 Fatty Acids (FISH OIL CONCENTRATE OR) Take 1 capsule by mouth daily. Cholecalciferol (VITAMIN D3) 2000 units Oral Tab Take 1 capsule by mouth daily. Vitamin C (VITAMIN C) 500 MG Oral Tab Take  by mouth. cholecalciferol 25 MCG (1000 UT) Oral Cap Take by mouth. Allergies:No Known Allergies    Past Medical History:   Diagnosis Date    Obesity     Thyroid nodule       Past Surgical History:   Procedure Laterality Date    COLONOSCOPY      egd and colonoscopy 2012    TUBAL LIGATION Bilateral 1987      Family History   Problem Relation Age of Onset    Diabetes Father     Heart Disorder Father     Diabetes Sister       Social History:   Social History     Socioeconomic History    Marital status:    Tobacco Use    Smoking status: Former     Passive exposure: Past    Smokeless tobacco: Never    Tobacco comments:     quit smoking 1991   Vaping Use    Vaping Use: Never used   Substance and Sexual Activity    Alcohol use: No     Alcohol/week: 0.0 standard drinks of alcohol    Drug use: No   Other Topics Concern    Caffeine Concern Yes     Comment: Coffee 1 cup daily; tea        Objective:   Physical Exam  Constitutional:       General: She is not in acute distress. Appearance: She is not ill-appearing, toxic-appearing or diaphoretic. Cardiovascular:      Rate and Rhythm: Normal rate and regular rhythm. Heart sounds: Normal heart sounds. No murmur heard. Pulmonary:      Effort: Pulmonary effort is normal. No respiratory distress. Breath sounds: Normal breath sounds. No wheezing or rales. Abdominal:      General: Bowel sounds are normal. There is no distension. Palpations: Abdomen is soft. There is no mass. Tenderness: There is no abdominal tenderness. There is no guarding. Musculoskeletal:      Cervical back: Normal range of motion. No rigidity or tenderness. Lumbar back: Tenderness present.  No swelling, edema, deformity, signs of trauma or bony tenderness. Decreased range of motion. Negative right straight leg raise test and negative left straight leg raise test.        Back:       Right lower leg: No edema. Left lower leg: No edema. Comments: Mild tenderness on the right lumbar area   Lymphadenopathy:      Cervical: No cervical adenopathy. Skin:     Coloration: Skin is not jaundiced or pale. Findings: No rash. Neurological:      Mental Status: She is alert. Sensory: Sensation is intact. No sensory deficit. Motor: Motor function is intact. No weakness. Deep Tendon Reflexes:      Reflex Scores:       Patellar reflexes are 2+ on the right side and 2+ on the left side. Achilles reflexes are 2+ on the right side and 2+ on the left side. Comments: Ankle clonus negative bilaterally         Assessment & Plan:   (S39.012A) Low back strain, initial encounter  (primary encounter diagnosis)  Plan: Physical Therapy Referral - [facility] [place]        Neuro exam no deficit; suspect low back strain which started after she lifted her grandson. Told her to restart her flexeril she has at home and cntinue with otc nsaids. Heating pad. If not improving, she will call back and also start PT. No orders of the defined types were placed in this encounter.       Meds This Visit:  Requested Prescriptions      No prescriptions requested or ordered in this encounter       Imaging & Referrals:  FLU VACC HIGH DOSE PRSV FREE

## 2023-10-30 ENCOUNTER — OFFICE VISIT (OUTPATIENT)
Dept: INTERNAL MEDICINE CLINIC | Facility: CLINIC | Age: 68
End: 2023-10-30

## 2023-10-30 VITALS
OXYGEN SATURATION: 95 % | HEIGHT: 63 IN | SYSTOLIC BLOOD PRESSURE: 122 MMHG | DIASTOLIC BLOOD PRESSURE: 75 MMHG | BODY MASS INDEX: 38.93 KG/M2 | HEART RATE: 71 BPM | WEIGHT: 219.69 LBS

## 2023-10-30 DIAGNOSIS — E55.9 VITAMIN D DEFICIENCY: ICD-10-CM

## 2023-10-30 DIAGNOSIS — E78.00 PURE HYPERCHOLESTEROLEMIA: ICD-10-CM

## 2023-10-30 DIAGNOSIS — Z12.11 SCREENING FOR COLON CANCER: ICD-10-CM

## 2023-10-30 DIAGNOSIS — M40.204 KYPHOSIS OF THORACIC REGION, UNSPECIFIED KYPHOSIS TYPE: ICD-10-CM

## 2023-10-30 DIAGNOSIS — S60.459A FOREIGN BODY FINGER: ICD-10-CM

## 2023-10-30 DIAGNOSIS — Z00.00 MEDICARE ANNUAL WELLNESS VISIT, SUBSEQUENT: Primary | ICD-10-CM

## 2023-10-30 DIAGNOSIS — E04.1 THYROID NODULE: ICD-10-CM

## 2023-10-30 DIAGNOSIS — Z12.31 ENCOUNTER FOR SCREENING MAMMOGRAM FOR BREAST CANCER: ICD-10-CM

## 2023-10-30 DIAGNOSIS — M81.0 AGE-RELATED OSTEOPOROSIS WITHOUT CURRENT PATHOLOGICAL FRACTURE: ICD-10-CM

## 2023-10-30 DIAGNOSIS — Z13.6 SCREENING FOR HEART DISEASE: ICD-10-CM

## 2023-10-30 PROBLEM — Z78.0 MENOPAUSE: Status: RESOLVED | Noted: 2021-04-19 | Resolved: 2023-10-30

## 2023-10-30 PROBLEM — R05.1 ACUTE COUGH: Status: RESOLVED | Noted: 2017-02-06 | Resolved: 2023-10-30

## 2023-12-21 ENCOUNTER — HOSPITAL ENCOUNTER (OUTPATIENT)
Dept: MAMMOGRAPHY | Facility: HOSPITAL | Age: 68
Discharge: HOME | End: 2023-12-21
Attending: INTERNAL MEDICINE
Payer: MEDICARE

## 2023-12-21 ENCOUNTER — HOSPITAL ENCOUNTER (OUTPATIENT)
Dept: ULTRASOUND IMAGING | Facility: HOSPITAL | Age: 68
Discharge: HOME | End: 2023-12-21
Attending: INTERNAL MEDICINE
Payer: MEDICARE

## 2023-12-21 DIAGNOSIS — R92.8 ABNORMAL MAMMOGRAM: ICD-10-CM

## 2023-12-21 PROCEDURE — 77061 BREAST TOMOSYNTHESIS UNI: CPT | Performed by: INTERNAL MEDICINE

## 2023-12-21 PROCEDURE — 77065 DX MAMMO INCL CAD UNI: CPT | Performed by: INTERNAL MEDICINE

## 2023-12-21 PROCEDURE — 76642 ULTRASOUND BREAST LIMITED: CPT | Performed by: INTERNAL MEDICINE

## 2024-03-06 ENCOUNTER — NURSE TRIAGE (OUTPATIENT)
Dept: INTERNAL MEDICINE CLINIC | Facility: CLINIC | Age: 69
End: 2024-03-06

## 2024-03-06 NOTE — TELEPHONE ENCOUNTER
Action Requested: Summary for Provider     []  Critical Lab, Recommendations Needed  [] Need Additional Advice  []   FYI    []   Need Orders  [] Need Medications Sent to Pharmacy  [x]  Other     SUMMARY: Verified name and .    Patient states that she is currently in [place]- went to urgent care for leg pain and was prescribed steroids. She states that while taking steroids, she started to have difficulty sleeping. She reports she took her last dose on Karl 3/3/24, however that she continues to have issues sleeping.    She reports that she has tried Benadryl and Chamomile with no relief.    Patient was advised per protocol to try Melatonin and also to push fluids to help flush the steroid out of her system. She was advised to call back if the Melatonin does not help.    Patient verbalizes understanding and agrees with plan.      Future Appointments   Date Time Provider Department Center   3/13/2024 11:30 AM Jose Luis Carlson MD [facility]  [facility]   2024  2:40 PM [facility]       Reason for call: Acute  Onset: Data Unavailable          Reason for Disposition   Difficulty falling to sleep or staying asleep    Protocols used: Insomnia-A-OH

## 2024-03-13 ENCOUNTER — HOSPITAL ENCOUNTER (OUTPATIENT)
Dept: GENERAL RADIOLOGY | Age: 69
Discharge: HOME | End: 2024-03-13
Attending: INTERNAL MEDICINE
Payer: MEDICARE

## 2024-03-13 ENCOUNTER — OFFICE VISIT (OUTPATIENT)
Dept: INTERNAL MEDICINE CLINIC | Facility: CLINIC | Age: 69
End: 2024-03-13

## 2024-03-13 VITALS
HEIGHT: 63 IN | WEIGHT: 206.63 LBS | SYSTOLIC BLOOD PRESSURE: 121 MMHG | HEART RATE: 71 BPM | TEMPERATURE: 98 F | BODY MASS INDEX: 36.61 KG/M2 | OXYGEN SATURATION: 98 % | DIASTOLIC BLOOD PRESSURE: 76 MMHG

## 2024-03-13 DIAGNOSIS — M79.605 LEFT LEG PAIN: ICD-10-CM

## 2024-03-13 DIAGNOSIS — M79.605 LEFT LEG PAIN: Primary | ICD-10-CM

## 2024-03-13 PROCEDURE — 73590 X-RAY EXAM OF LOWER LEG: CPT | Performed by: INTERNAL MEDICINE

## 2024-03-13 PROCEDURE — 73552 X-RAY EXAM OF FEMUR 2/>: CPT | Performed by: INTERNAL MEDICINE

## 2024-03-13 PROCEDURE — 99213 OFFICE O/P EST LOW 20 MIN: CPT | Performed by: INTERNAL MEDICINE

## 2024-03-13 RX ORDER — TRIAMCINOLONE ACETONIDE 1 MG/G
CREAM TOPICAL 2 TIMES DAILY PRN
Qty: 30 G | Refills: 0 | Status: SHIPPED | OUTPATIENT
Start: 2024-03-13

## 2024-03-13 NOTE — PROGRESS NOTES
Subjective:     Patient ID: Marlee Rush is a 68 year old female.    Leg Pain   The pain is present in the left lower leg, left upper leg and left hip. This is a new (left lateral thigh to the lateral leg) problem. The current episode started 1 to 4 weeks ago (4 weeks). There has been no history of extremity trauma. The problem occurs constantly. The problem has been waxing and waning. The quality of the pain is described as aching. The pain is at a severity of 8/10. The pain is moderate. Associated symptoms include a limited range of motion. Pertinent negatives include no fever, itching, joint swelling, numbness, stiffness or tingling. The symptoms are aggravated by standing (walking). She has tried rest (oral steroids) for the symptoms.       History/Other:   Review of Systems   Constitutional: Negative.  Negative for fever.   Respiratory: Negative.     Cardiovascular: Negative.    Gastrointestinal: Negative.    Genitourinary: Negative.    Musculoskeletal:  Positive for back pain. Negative for stiffness.   Skin:  Negative for itching.   Neurological:  Negative for tingling, weakness and numbness.     Current Outpatient Medications   Medication Sig Dispense Refill    CALCIUM OR Take by mouth.      Probiotic Product (PROBIOTIC ADVANCED) Oral Cap Take by mouth.      Multiple Vitamin (MULTI-VITAMIN DAILY) Oral Tab Take 1 tablet by mouth daily.      Omega-3 Fatty Acids (FISH OIL CONCENTRATE OR) Take 1 capsule by mouth daily.      Cholecalciferol (VITAMIN D3) 2000 units Oral Tab Take 1 capsule by mouth daily.      Vitamin C (VITAMIN C) 500 MG Oral Tab Take  by mouth.      cholecalciferol 25 MCG (1000 UT) Oral Cap Take by mouth.      cyclobenzaprine 5 MG Oral Tab Take 1-2 tablets (5-10 mg total) by mouth 3 (three) times daily as needed for Muscle spasms. May cause sedation; no driving. DO NOT TAKE WITH HYDROCODONE. (Patient not taking: Reported on 3/13/2024) 90 tablet 1    HYDROcodone-acetaminophen (NORCO) 5-325 MG Oral  Tab Take 1 tablet by mouth 2 (two) times daily as needed for Pain (FOR SEVERE PAIN.). DO NOT TAKE WITH CYCLOBENZAPRINE. (Patient not taking: Reported on 10/30/2023) 12 tablet 0    alendronate 70 MG Oral Tab Take 1 tablet (70 mg total) by mouth once a week. Take once weekly in AM, at least 30 minutes before the first food, beverage, or medication of the day. (Patient not taking: Reported on 10/30/2023) 12 tablet 2     Allergies:No Known Allergies    Past Medical History:   Diagnosis Date    Obesity     Thyroid nodule       Past Surgical History:   Procedure Laterality Date    COLONOSCOPY      egd and colonoscopy 2012    TUBAL LIGATION Bilateral 1987      Family History   Problem Relation Age of Onset    Diabetes Father     Heart Disorder Father     Heart Disorder Mother     Diabetes Sister       Social History:   Social History     Socioeconomic History    Marital status:    Tobacco Use    Smoking status: Former     Passive exposure: Past    Smokeless tobacco: Never    Tobacco comments:     quit smoking 1991   Vaping Use    Vaping Use: Never used   Substance and Sexual Activity    Alcohol use: No     Alcohol/week: 0.0 standard drinks of alcohol    Drug use: No   Other Topics Concern    Caffeine Concern Yes     Comment: Coffee 1 cup daily; tea        Objective:   Physical Exam  Constitutional:       General: She is not in acute distress.     Appearance: She is obese. She is not ill-appearing, toxic-appearing or diaphoretic.   HENT:      Right Ear: External ear normal.      Left Ear: External ear normal.   Cardiovascular:      Rate and Rhythm: Normal rate and regular rhythm.      Pulses:           Dorsalis pedis pulses are 3+ on the right side and 3+ on the left side.        Posterior tibial pulses are 3+ on the right side and detected w/ Doppler on the left side.      Heart sounds: Normal heart sounds. No murmur heard.     No gallop.   Pulmonary:      Effort: Pulmonary effort is normal. No respiratory distress.       Breath sounds: Normal breath sounds. No wheezing or rales.   Abdominal:      General: Bowel sounds are normal. There is no distension.      Palpations: Abdomen is soft. There is no mass.      Tenderness: There is no abdominal tenderness. There is no guarding.   Musculoskeletal:      Cervical back: Normal range of motion and neck supple. No rigidity or tenderness.      Lumbar back: No swelling, edema, deformity, tenderness or bony tenderness. Normal range of motion. Negative right straight leg raise test and negative left straight leg raise test.      Right hip: Normal.      Left hip: Normal.      Right knee: No LCL laxity, MCL laxity, ACL laxity or PCL laxity. Normal patellar mobility. Normal pulse.      Left knee: Deformity present. No swelling, effusion, erythema, ecchymosis or bony tenderness. Normal range of motion. No tenderness. No LCL laxity, MCL laxity, ACL laxity or PCL laxity.Normal patellar mobility. Normal pulse.      Comments: Varus defomity of the left knee   Feet:      Right foot:      Skin integrity: Skin integrity normal.      Left foot:      Skin integrity: Skin integrity normal.   Lymphadenopathy:      Cervical: No cervical adenopathy.   Skin:     Coloration: Skin is not jaundiced or pale.   Neurological:      Mental Status: She is alert.      Sensory: Sensation is intact. No sensory deficit.      Motor: No weakness, tremor or abnormal muscle tone.      Deep Tendon Reflexes:      Reflex Scores:       Patellar reflexes are 2+ on the right side and 2+ on the left side.       Achilles reflexes are 2+ on the right side and 2+ on the left side.     Comments: Ankle clonus negative bilaterally          Assessment & Plan:   (M79.605) Left leg pain  (primary encounter diagnosis)  Plan: XR FEMUR MIN 2 VIEWS LEFT (CPT=73552), XR TIBIA        + FIBULA (2 VIEWS), LEFT (CPT=73590)        Left thigh pain and leg pain anterolateral aspect with no swelling noted.    Will get xray of left femur and left leg.  If negative, will proceed with doing mri lumbar spine since this may be radicular pain coming from her back.  Neuro exam no deficit.        No orders of the defined types were placed in this encounter.      Meds This Visit:  Requested Prescriptions      No prescriptions requested or ordered in this encounter       Imaging & Referrals:  None

## 2024-03-14 ENCOUNTER — TELEPHONE (OUTPATIENT)
Dept: INTERNAL MEDICINE CLINIC | Facility: CLINIC | Age: 69
End: 2024-03-14

## 2024-03-14 NOTE — TELEPHONE ENCOUNTER
Patient called office. Date of birth and full name both confirmed.   Asking about test results.   Advised results have not been reviewed by Dr. Carlson .   Advised our providers review and comment on all test results, normal or otherwise.   She verbalizes understanding of all information, and agreeable to plan.

## 2024-03-15 ENCOUNTER — TELEPHONE (OUTPATIENT)
Dept: ORTHOPEDICS CLINIC | Facility: CLINIC | Age: 69
End: 2024-03-15

## 2024-03-15 NOTE — TELEPHONE ENCOUNTER
S/w pt and she states left leg pain started 1 month ago. Pt denies any injury. Pt saw pcp on 3/13 and had femur and tibia/fibula XR, no fx. Pt reports some mild swelling. Pt feels like the pain starts on the lateral thigh and radiates down to lower leg.  Advised pt that 4/8 appt is the next available. Added her to wait list. Offered her phone number to [facility] to see if they have sooner availability and she may call there for an appt. If she gets sooner appt with [facility] she will cancel her appt with Dr Bloom through [facility].

## 2024-03-18 ENCOUNTER — TELEPHONE (OUTPATIENT)
Dept: INTERNAL MEDICINE CLINIC | Facility: CLINIC | Age: 69
End: 2024-03-18

## 2024-03-18 ENCOUNTER — TELEPHONE (OUTPATIENT)
Dept: ORTHOPEDICS CLINIC | Facility: CLINIC | Age: 69
End: 2024-03-18

## 2024-03-18 ENCOUNTER — HOSPITAL ENCOUNTER (OUTPATIENT)
Dept: ULTRASOUND IMAGING | Facility: HOSPITAL | Age: 69
Discharge: HOME | End: 2024-03-18
Attending: INTERNAL MEDICINE
Payer: MEDICARE

## 2024-03-18 ENCOUNTER — HOSPITAL ENCOUNTER (EMERGENCY)
Facility: HOSPITAL | Age: 69
Discharge: LEFT BEFORE DISCHARGE | End: 2024-03-18
Payer: MEDICARE

## 2024-03-18 DIAGNOSIS — M79.605 LEFT LEG PAIN: Primary | ICD-10-CM

## 2024-03-18 DIAGNOSIS — M79.605 LEFT LEG PAIN: ICD-10-CM

## 2024-03-18 PROCEDURE — 93971 EXTREMITY STUDY: CPT | Performed by: INTERNAL MEDICINE

## 2024-03-18 NOTE — TELEPHONE ENCOUNTER
Pt daughter is asking if her mom can be seen sooner. Currently has an appointment on 3/28.     She states that her left leg and thigh is giving her the most issues.  (pCP) is recommending an MRI due to the severe pain. No DVT per doppler yesterday.    Advised that we will check with MD to see if there is anything earlier that can be offered~    Please advise?

## 2024-03-18 NOTE — TELEPHONE ENCOUNTER
Verified name and .    Patient was seen in office on 3/13/24 for left leg pain.    She states that her leg pain continues and seems to be worse- difficult to sleep at night due to the pain.    She states that the soonest she was able to get in to see an orthopedic specialist is 3/28/24.    She states that she still has left over Cyclobenzaprine 5 mg and Norco 5-325 mg previously prescribed by Dr. Phillip back in 2023 for shoulder pain and asks if she can take these medications to help with her leg pain.    Please advise and thank you.    Future Appointments   Date Time Provider Department Center   3/28/2024  2:00 PM Gonzalo Guerra DO [id number]   2024  1:30 PM Feliciano Bloom MD [facility]   2024  2:40 PM Leah Chauhan MD [facility]

## 2024-03-18 NOTE — TELEPHONE ENCOUNTER
Ok to start flexeril and norco she has at home.   I also would want to check stat venous doppler left leg just to make sure no DVT given her pain she is getting.

## 2024-03-18 NOTE — TELEPHONE ENCOUNTER
Pt informed to start flexeril and norco and scheduled for stat ultrasound at 3:30pm [facility].

## 2024-03-20 ENCOUNTER — OFFICE VISIT (OUTPATIENT)
Dept: ORTHOPEDICS CLINIC | Facility: CLINIC | Age: 69
End: 2024-03-20
Payer: MEDICARE

## 2024-03-20 VITALS — WEIGHT: 206 LBS | BODY MASS INDEX: 36.5 KG/M2 | HEIGHT: 63 IN

## 2024-03-20 DIAGNOSIS — M25.552 GREATER TROCHANTERIC PAIN SYNDROME OF LEFT LOWER EXTREMITY: Primary | ICD-10-CM

## 2024-03-20 DIAGNOSIS — M54.16 LEFT LUMBAR RADICULOPATHY: ICD-10-CM

## 2024-03-20 PROCEDURE — 99204 OFFICE O/P NEW MOD 45 MIN: CPT | Performed by: FAMILY MEDICINE

## 2024-03-20 PROCEDURE — 20611 DRAIN/INJ JOINT/BURSA W/US: CPT | Performed by: FAMILY MEDICINE

## 2024-03-20 RX ORDER — DICLOFENAC SODIUM 75 MG/1
75 TABLET, DELAYED RELEASE ORAL 2 TIMES DAILY
Qty: 28 TABLET | Refills: 1 | Status: SHIPPED | OUTPATIENT
Start: 2024-03-20

## 2024-03-20 RX ORDER — CYCLOBENZAPRINE HCL 10 MG
10 TABLET ORAL NIGHTLY
Qty: 20 TABLET | Refills: 0 | Status: SHIPPED | OUTPATIENT
Start: 2024-03-20 | End: 2024-04-09

## 2024-03-20 RX ORDER — KETOROLAC TROMETHAMINE 30 MG/ML
30 INJECTION, SOLUTION INTRAMUSCULAR; INTRAVENOUS ONCE
Status: COMPLETED | OUTPATIENT
Start: 2024-03-20 | End: 2024-03-20

## 2024-03-20 RX ORDER — TRIAMCINOLONE ACETONIDE 40 MG/ML
40 INJECTION, SUSPENSION INTRA-ARTICULAR; INTRAMUSCULAR ONCE
Status: COMPLETED | OUTPATIENT
Start: 2024-03-20 | End: 2024-03-20

## 2024-03-20 RX ADMIN — KETOROLAC TROMETHAMINE 30 MG: 30 INJECTION, SOLUTION INTRAMUSCULAR; INTRAVENOUS at 12:31:00

## 2024-03-20 RX ADMIN — TRIAMCINOLONE ACETONIDE 40 MG: 40 INJECTION, SUSPENSION INTRA-ARTICULAR; INTRAMUSCULAR at 12:31:00

## 2024-03-20 NOTE — H&P
Sports Medicine Clinic Note     Subjective:     Chief Complaint   Patient presents with    Leg Pain     LT LEG PAIN ONSET:  - PAIN STARTS AT THE HIP WORSENS AT THE KNEE AND GOES DOWN INTO THE FOOT  -pt is having difficulty sleeping due to pain  - hydrocodone-act and cyclobenzaprine and meloxicam did not help with pain  - possible meniscus tear, was given steroid which did help      History of Present Illness: This is a pleasant 68 year old female who presents with lower back and lateral hip pain radiating down the left leg. The pain is described as a sharp, shooting sensation that worsens with prolonged sitting and certain movements, specifically twisting and bending. The patient states that the symptoms have been persistent for the last month. No alleviating factors are noted, and the patient mentions NSAIDs, flexeril and even narcotic meds have provided minimal relief. No prior episodes are reported. An MRI of the lumbar spine pending or radiculopathy suspected by her PCP.     Objective:     Physical Exam    Ht 5' 3\" (1.6 m)   Wt 206 lb (93.4 kg)   BMI 36.49 kg/m²     Constitutional:   NAD. AOx3. Well-developed and Well-nourished.     Psychiatric:   Normal mood/ affect/ behavior. Judgment and thought content normal.    Lumbar Examination:    Inspection:   No visible deformity, swelling, or discoloration in the lumbar area.    Palpation:   Paraspinal tenderness noted.    Active/Passive ROM:   Limited flexion and extension due to pain; lateral rotation exacerbates the symptoms.    Neurovascular:   Normal strength and sensation in the bilateral lower extremities. Symmetric deep tendon patellar and achilles reflexes. No vascular abnormalities noted.    Special Orthopedic Tests:   Positive straight leg raise left side.    Left Hip    Inspection/Palpation/ROM  Skin without erythema, breakdown or rash  No gross leg length discrepancy appreciated  Pain with palpation over the greater trochanter  Painless passive ROM: IR  35*/ER45*/Flexion 135*/Abduction 45*/ Adduction 25*    Neurovascular  2+ dp/pt pulses  SILT intact in Fem/Tib/Saphenous/Sural/Deep & Superficial Peroneal nerves  Fires Quad/Hamstrings/GastrocSoleusComplex/AT/EHL muscle groups    Special  Negative FADIR  Negative ELVIN  Negative Scour  Negative Stinchfield's    Imaging:    X-ray Tibia and Fibula (2 Views), Left (CPT=73590): No acute fracture or dislocation noted. 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 Soft tissues and effusion are unremarkable.   X-ray Femur (Min 2 Views), Left (CPT=73552): No significant arthropathy, fracture, or acute abnormality. Soft tissues and effusion are unremarkable.   US Venous Doppler Leg Left - Diag Img (CPT=93971): No evidence of deep venous thrombosis. Normal flow was demonstrated in the femoral and popliteal veins as well as the visualized portions of the great and small saphenous, posterior tibial, and peroneal veins.    Assessment & Plan:     68 year old female with clinical history and examination consistent with    Lumbar radiculopathy, left  Trochanteric pain syndrome, left    The patient will be treated with the following:    The patient will benefit from a multi-modal treatment approach. A prescription for physical therapy is deferred for now by the patient in favor of better pain control, discussed focusing on core strengthening and spinal stabilization exercises. MRI of the lumbar spine is already scheduled, can discuss potential intervention based on results but in interim recommend maximizing PO meds and discussed combining, NSAIDs, Tylenol, and Flexeril to start as well as trial of targeted injection of the left trochanteric bursa based on clinical findings in this area. Activity is to be limited, and the patient is advised to avoid lifting heavy objects or twisting motions. A lumbar brace may be considered for additional support. Tentative follow-up is scheduled to review MRI and assess  the response to treatment. Can consider a steroid burst or neuromodulating medications as a next step depending on MRI findings and response to above treatments.    Ultrasound Guided Procedure Note:    After discussion of the risks and benefits, the patient elected to proceed with a therapeutic injection into the left trochanteric bursa under US guidance. Confirmed that the patient does not have history of prior adverse reactions, active infections, or relevant allergies. There was no erythema or warmth, and the skin was clear.    The skin was sterilized with ChloraPrep. A 22 gauge needle was inserted via inferolateral approach utilizing US for needle guidance and placement. The site was injected with a mixture of 1 mL of kenalog 40 mg/mL, 1 mL of Toradol 30 mg/mL and 1 mL of 1% Lidocaine without Epinephrine. The injection was completed without complication, and a bandage was applied.    The patient tolerated the procedure well and was instructed to avoid strenuous activity for the next 24-48 hours and to use ice or Tylenol for pain as needed. The patient will call immediately with any signs of infection or allergic reaction.    Post-Injection Care: The patient tolerated the procedure well. An occlusive bandage was placed over the injection site. Post-injection care instructions provided to the patient. The patient was asked to avoid strenuous activity and continue to rest the area for 2-3 days before resuming regular activities. Patient advised that the area may be more painful for the first 1-2 days. They can use ice or Tylenol for pain as needed.  Patient was instructed to watch for fever, increased swelling, or persistent pain. The patient will call immediately with any signs of infection or allergic reaction.    Complications: The patient tolerated the procedure well without any complications.    Gonzalo Guerra DO, [facility]   Primary Care Sports Medicine    Department of Orthopaedic Surgery  [place]  [place] [id number] [place] [address]   [address] [place] [address]    t: [phone]  f: [phone]      [facility]

## 2024-03-21 ENCOUNTER — TELEPHONE (OUTPATIENT)
Dept: ORTHOPEDICS CLINIC | Facility: CLINIC | Age: 69
End: 2024-03-21

## 2024-03-21 NOTE — TELEPHONE ENCOUNTER
Called the patient and discussed her pain and the medications in detail that she was prescribed.  All questions answered at this time.

## 2024-03-23 ENCOUNTER — HOSPITAL ENCOUNTER (OUTPATIENT)
Dept: MRI IMAGING | Age: 69
Discharge: HOME | End: 2024-03-23
Attending: INTERNAL MEDICINE
Payer: MEDICARE

## 2024-03-23 DIAGNOSIS — M54.42 ACUTE LEFT-SIDED LOW BACK PAIN WITH LEFT-SIDED SCIATICA: ICD-10-CM

## 2024-03-23 DIAGNOSIS — M79.605 LEFT LEG PAIN: ICD-10-CM

## 2024-03-23 PROCEDURE — 72148 MRI LUMBAR SPINE W/O DYE: CPT | Performed by: INTERNAL MEDICINE

## 2024-03-25 ENCOUNTER — TELEPHONE (OUTPATIENT)
Dept: ORTHOPEDICS CLINIC | Facility: CLINIC | Age: 69
End: 2024-03-25

## 2024-03-25 ENCOUNTER — TELEPHONE (OUTPATIENT)
Dept: INTERNAL MEDICINE CLINIC | Facility: CLINIC | Age: 69
End: 2024-03-25

## 2024-03-25 NOTE — TELEPHONE ENCOUNTER
I called and notified the patient of results and recommended plan.  Patient did see ortho last week, was given meds but they really are not helping her. She is interested in taking the gabapentin but wants to check with ortho office to see if they agree to her taking this or other recommendations prior to us sending this in. MRI results came after ortho visit.    She will check with them and call us back if she would like to have this sent to pharmacy per ortho recommendations.   She has another visit Wednesday with ortho but would like her pain to better controlled prior.     Result notes as per below from Dr. Carlson:    Her mri lumbar spine showed several levels in her lumbar spine affected by bulging discs and arthritis that cause mild to moderate lumbar spinal stenosis as well narrowing of openings where nerves are passing thru that can be causing her left leg pain.    Proceed with seeing ortho as she had scheduled.  For now, we can give her gabapentin 300mg po HS #30 which may help some of her pain while awaiting to see ortho.

## 2024-03-25 NOTE — TELEPHONE ENCOUNTER
Patient states she spoke with her PCP who put her on Gabapentin and would like to ask Dr Guerra if she should not take the cyclobenzaprine, but stated she has an appointment with Dr Guerra on Wednesday and will just him him then.  No other questions or concerns.

## 2024-03-26 NOTE — TELEPHONE ENCOUNTER
Pt states she has an appointment with Ortho tomorrow and would like to wait until she sees Ortho before starting gabapentin. Pt will update Dr. Carlson after Ortho appointment.  Future Appointments   Date Time Provider Department Center   3/27/2024 11:30 AM Gonzalo Guerra, DO [facility]

## 2024-03-27 ENCOUNTER — OFFICE VISIT (OUTPATIENT)
Dept: ORTHOPEDICS CLINIC | Facility: CLINIC | Age: 69
End: 2024-03-27
Payer: MEDICARE

## 2024-03-27 ENCOUNTER — TELEPHONE (OUTPATIENT)
Dept: INTERNAL MEDICINE CLINIC | Facility: CLINIC | Age: 69
End: 2024-03-27

## 2024-03-27 DIAGNOSIS — M43.06 LUMBAR SPONDYLOLYSIS: ICD-10-CM

## 2024-03-27 DIAGNOSIS — M48.061 SPINAL STENOSIS OF LUMBAR REGION WITHOUT NEUROGENIC CLAUDICATION: ICD-10-CM

## 2024-03-27 DIAGNOSIS — M54.16 LUMBAR RADICULOPATHY: Primary | ICD-10-CM

## 2024-03-27 PROCEDURE — 99214 OFFICE O/P EST MOD 30 MIN: CPT | Performed by: FAMILY MEDICINE

## 2024-03-27 RX ORDER — GABAPENTIN 300 MG/1
300 CAPSULE ORAL 3 TIMES DAILY
Qty: 90 CAPSULE | Refills: 1 | Status: SHIPPED | OUTPATIENT
Start: 2024-03-27 | End: 2024-05-26

## 2024-03-27 NOTE — PROGRESS NOTES
Sports Medicine Clinic Note     Subjective:    Chief Complaint   Patient presents with    Follow - Up     Lt hip steroid inj   -pain is the same, inj did noti mprove the pain, site of injections is still painful   - lumbar spine mri done      History of Present Illness: The patient is a pleasant 68-year-old female returning for follow-up after a therapeutic injection into the left trochanteric bursa for trochanteric pain syndrome. She reports no improvement in her left hip pain since the injection and describes the injection site as still painful. Additionally, she has undergone an MRI of the lumbar spine as part of the ongoing investigation into her left-sided low back pain with sciatica and is here to review findings.    Objective:    Left Hip and Lumbar Spine Examination:    Inspection:  - No visible erythema or swelling at the injection site.  - No new deformities or discoloration.    Palpation:  - Persistent tenderness at the trochanteric bursa injection site.  - Lumbar paraspinal tenderness noted.    Active/Passive ROM:   Limited flexion and extension due to pain; lateral rotation exacerbates the symptoms.  - No warmth or signs of infection.    Range of Motion:  - Range of motion remains as previously documented.    Neurovascular:  - No changes noted from previous examination. Neurovascular status intact.    Special Tests:  - Positive SLR left side    Diagnostic Tests:  MRI SPINE LUMBAR Findings:  - Levoscoliosis, multilevel degenerative changes, facet arthropathy, and mild-to-moderate central and foraminal narrowing most pronounced at L4-L5.  - No acute fracture, malalignment, or significant abnormalities of the cord/cauda equina noted.    Assessment:    1. Trochanteric pain syndrome, left - no improvement post-steroid injection.  2. Lumbar radiculopathy, left - supported by MRI findings of degenerative changes contributing to nerve root compression.    Plan:    Additional imaging/workup:  - None indicated at  this time.    Therapy:  - Initiate physical therapy focusing on core strengthening and spinal stabilization exercises. Encourage gentle stretching and mobility exercises for the hip.    Medications:  - Continue NSAIDs and Tylenol as previously prescribed.  - Add a neuromodulating medicine, gabapentin, for symptom control.    Procedures:  - Given the lack of improvement with the initial injection, reconsider additional therapeutic options such as a repeat injection or referral to a pain management specialist for evaluation.    Activity Recommendations:  - Advise the patient to continue limiting activities that exacerbate pain, including heavy lifting and twisting motions.    Follow-Up: Tentatively scheduled in 4 weeks to reassess pain management and response to physical therapy. Instruct the patient to return sooner if symptoms worsen.    Gonzalo Guerra DO, CAQSM   Primary Care Sports Medicine    Department of Orthopaedic Surgery  [address] [address]   [address] [place] [address]    t: [phone]  f: [phone]      [facility]

## 2024-03-28 ENCOUNTER — PATIENT MESSAGE (OUTPATIENT)
Dept: ORTHOPEDICS CLINIC | Facility: CLINIC | Age: 69
End: 2024-03-28

## 2024-03-28 NOTE — TELEPHONE ENCOUNTER
Requested Prescriptions     Pending Prescriptions Disp Refills    alendronate 70 MG Oral Tab 12 tablet 2     Sig: Take 1 tablet (70 mg total) by mouth once a week. Take once weekly in AM, at least 30 minutes before the first food, beverage, or medication of the day.     Future Appointments   Date Time Provider Department Center   5/1/2024  1:00 PM Gonzalo Guerra DO [facility]   6/19/2024  2:40 PM Leah Chauhan MD [facility]     LOV: 11/28/22  Last Refilled:11/28/2022 #12 2RF  Labs:    Component      Latest Ref Rng 4/11/2023   WBC      4.0 - 11.0 x10(3) uL 7.4    RBC      3.80 - 5.30 x10(6)uL 5.15    Hemoglobin      12.0 - 16.0 g/dL 13.1    Hematocrit      35.0 - 48.0 % 42.2    MCV      80.0 - 100.0 fL 81.9    MCH      26.0 - 34.0 pg 25.4 (L)    MCHC      31.0 - 37.0 g/dL 31.0    RDW-SD      35.1 - 46.3 fL 47.4 (H)    RDW      11.0 - 15.0 % 15.7 (H)    Platelet Count      150.0 - 450.0 10(3)uL 271.0    Prelim Neutrophil Abs      1.50 - 7.70 x10 (3) uL 4.24    Neutrophils Absolute      1.50 - 7.70 x10(3) uL 4.24    Lymphocytes Absolute      1.00 - 4.00 x10(3) uL 2.48    Monocytes Absolute      0.10 - 1.00 x10(3) uL 0.44    Eosinophils Absolute      0.00 - 0.70 x10(3) uL 0.20    Basophils Absolute      0.00 - 0.20 x10(3) uL 0.03    Immature Granulocyte Absolute      0.00 - 1.00 x10(3) uL 0.05    Neutrophils %      % 57.0    Lymphocytes %      % 33.3    Monocytes %      % 5.9    Eosinophils %      % 2.7    Basophils %      % 0.4    Immature Granulocyte %      % 0.7    Glucose      70 - 99 mg/dL 94    Sodium      136 - 145 mmol/L 141    Potassium      3.5 - 5.1 mmol/L 4.1    Chloride      98 - 112 mmol/L 109    Carbon Dioxide, Total      21.0 - 32.0 mmol/L 27.0    ANION GAP      0 - 18 mmol/L 5    BUN      7 - 18 mg/dL 10    CREATININE      0.55 - 1.02 mg/dL 0.45 (L)    BUN/CREATININE RATIO      10.0 - 20.0  22.2 (H)    CALCIUM      8.5 - 10.1 mg/dL 9.1    CALCULATED OSMOLALITY      275 - 295  mOsm/kg 291    EGFR      >=60 mL/min/1.73m2 105    ALT (SGPT)      13 - 56 U/L 24    AST (SGOT)      15 - 37 U/L 11 (L)    ALKALINE PHOSPHATASE      55 - 142 U/L 88    Total Bilirubin      0.1 - 2.0 mg/dL 0.4    PROTEIN, TOTAL      6.4 - 8.2 g/dL 6.7    Albumin      3.4 - 5.0 g/dL 3.2 (L)    Globulin      2.8 - 4.4 g/dL 3.5    A/G Ratio      1.0 - 2.0  0.9 (L)    Patient Fasting for CMP? Yes       Legend:  (L) Low  (H) High    ASSESSMENT AND PLAN:      Thoracic kyphosis  - This is likely due to her osteoporosis, x-ray showing mild chronic anterior wedging of the T11 and T12 vertebral bodies  - She does not have a lot of pain  - She would like to do physical therapy, referral placed     Osteoporosis  - Bone density in January 2022 showed left femoral neck T score -2.2.  Thoracic x-ray is showing evidence of anterior wedging fracture which puts her in the category of osteoporosis no  - We discussed treatment options.  She was agreeable to Fosamax weekly. Risks/benefits of Bisphosphonate d/w patient which include: muscle cramps/pain, pain with swallowing, heartburn, abdominal pain, nausea, headache, and/or rash. Rare manifestations include osteonecrosis of the jaw and abnormal fractures of the femur. With oral bisphosphonates pts should take the medication first thing in the morning on an empty stomach with a full glass of water. Pt should remain upright for at least 30-60 minutes after medication ingestion. Will need to obtain vitamin D prior to starting and if <20 will need to treat  - Blood work today      L knee pain  - X-ray was normal, she will be having MRI of her left knee next week     Thank you for allowing me to participate in this patients care. Pt will f/u in 6 mos      Leah Chauhna MD  11/28/2022  2:12 PM

## 2024-03-28 NOTE — TELEPHONE ENCOUNTER
From: Marlee Rush  To: Gonzalo MOREJON  Sent: 3/28/2024 9:55 AM CDT  Subject: Physical therapy script - fax     Hi- I wasn’t given the script for physical therapy. Please fax it to [facility] in [place] ATTN: Hardik here is their fax number [phone] thank you. Please confirm confirmation

## 2024-03-29 RX ORDER — ALENDRONATE SODIUM 70 MG/1
70 TABLET ORAL WEEKLY
Qty: 12 TABLET | Refills: 2 | Status: SHIPPED | OUTPATIENT
Start: 2024-03-29

## 2024-04-02 ENCOUNTER — MED REC SCAN ONLY (OUTPATIENT)
Dept: INTERNAL MEDICINE CLINIC | Facility: CLINIC | Age: 69
End: 2024-04-02

## 2024-04-02 ENCOUNTER — TELEPHONE (OUTPATIENT)
Dept: INTERNAL MEDICINE CLINIC | Facility: CLINIC | Age: 69
End: 2024-04-02

## 2024-04-02 NOTE — TELEPHONE ENCOUNTER
Plan of care dated 4/1/24 received from [facility], placed on Dr Carlson's desk for review and signature.

## 2024-04-05 ENCOUNTER — TELEPHONE (OUTPATIENT)
Dept: ORTHOPEDICS CLINIC | Facility: CLINIC | Age: 69
End: 2024-04-05

## 2024-04-05 NOTE — TELEPHONE ENCOUNTER
Patient called stating her pain is not getting better and she would like to know what to do. She would like a call back

## 2024-04-08 NOTE — TELEPHONE ENCOUNTER
Called patient to review pain managment  Current treatment: Gabapentin, Advil  LOV: 03/27/24    Reviewed with patient from LOV:   Medications:  - Continue NSAIDs and Tylenol as previously prescribed.  - Add a neuromodulating medicine, gabapentin, for symptom control.    Recommendation: stretching before getting out of bed.  (Patient notes stiffness in the morning), adding tylenol as previously suggested at lov.  Physical therapy has not started.     - Call back and move next follow up sooner if she does not get relief from tylenol and PT addition.      Future Appointments   Date Time Provider Department Center   5/1/2024  1:00 PM Gonzalo Guerra DO [facility]   6/19/2024  2:40 PM Leah Chauhan MD [facility]   7/11/2024  1:20 PM Chuckie South DO [id number]

## 2024-04-10 ENCOUNTER — TELEPHONE (OUTPATIENT)
Dept: INTERNAL MEDICINE CLINIC | Facility: CLINIC | Age: 69
End: 2024-04-10

## 2024-04-10 NOTE — TELEPHONE ENCOUNTER
Patient called office. Date of birth and full name both confirmed.   Asking if still needs to take gabapentin.   She does not take diclofenac - does not help per patient.   Reports advil as needed helps.     RN advised these medications were ordered by Ortho, Dr Guerra and to discuss with Ortho. But if additional questions or concerns for Dr. Carlson, call our office. She verbalizes understanding of all information, and agreeable to plan.

## 2024-04-19 ENCOUNTER — TELEPHONE (OUTPATIENT)
Dept: INTERNAL MEDICINE CLINIC | Facility: CLINIC | Age: 69
End: 2024-04-19

## 2024-04-19 DIAGNOSIS — M54.42 ACUTE LEFT-SIDED LOW BACK PAIN WITH LEFT-SIDED SCIATICA: Primary | ICD-10-CM

## 2024-04-19 NOTE — TELEPHONE ENCOUNTER
Called patient with referral information   Referred to Provider Information:  Provider Address Phone   Raúl Mena MD [facility] [address] [phone]   Sent referral in [facility] as requested.

## 2024-04-19 NOTE — TELEPHONE ENCOUNTER
Patient calling to state was directed to pain management by orthopedic doctor, asking for Dr. Carlson's recommendation of where to go.

## 2024-04-24 ENCOUNTER — TELEPHONE (OUTPATIENT)
Dept: ORTHOPEDICS CLINIC | Facility: CLINIC | Age: 69
End: 2024-04-24

## 2024-04-24 NOTE — TELEPHONE ENCOUNTER
Patient is requesting a recommendation for an epidural shot, states Dr. Guerra mentioned it before. Please advise.

## 2024-04-24 NOTE — TELEPHONE ENCOUNTER
Tried reaching pt via both numbers on HIPAA consent, no answer, no voicemail.  Sent [employer] msg.

## 2024-04-25 NOTE — TELEPHONE ENCOUNTER
Called patient to discuss pain management.  She states she already set up an appointment for tomorrow to establish care.     Provider Address Phone   Raúl Mena MD [address] [place] [address] [phone]     Future Appointments   Date Time Provider Department Center   4/26/2024  8:30 AM Yeimy Rivera DO [facility]   5/1/2024  1:00 PM Gonzalo Guerra DO [facility]   5/22/2024 10:40 AM [facility]   6/19/2024  2:40 PM [facility]   7/11/2024  1:20 PM Chuckie South DO [id number]

## 2024-04-26 ENCOUNTER — OFFICE VISIT (OUTPATIENT)
Dept: PAIN CLINIC | Facility: HOSPITAL | Age: 69
End: 2024-04-26
Attending: ANESTHESIOLOGY
Payer: MEDICARE

## 2024-04-26 ENCOUNTER — TELEPHONE (OUTPATIENT)
Dept: PAIN CLINIC | Facility: HOSPITAL | Age: 69
End: 2024-04-26

## 2024-04-26 VITALS
WEIGHT: 205 LBS | DIASTOLIC BLOOD PRESSURE: 82 MMHG | HEART RATE: 60 BPM | OXYGEN SATURATION: 95 % | BODY MASS INDEX: 36 KG/M2 | SYSTOLIC BLOOD PRESSURE: 129 MMHG

## 2024-04-26 DIAGNOSIS — M54.16 LUMBAR RADICULOPATHY: Primary | ICD-10-CM

## 2024-04-26 DIAGNOSIS — M48.061 SPINAL STENOSIS OF LUMBAR REGION WITHOUT NEUROGENIC CLAUDICATION: ICD-10-CM

## 2024-04-26 DIAGNOSIS — M54.42 ACUTE LEFT-SIDED LOW BACK PAIN WITH LEFT-SIDED SCIATICA: ICD-10-CM

## 2024-04-26 PROCEDURE — 99202 OFFICE O/P NEW SF 15 MIN: CPT

## 2024-04-26 NOTE — TELEPHONE ENCOUNTER
Prior authorization request completed for: DIA L4/5   Authorization #NO PRIOR AUTHORIZATION IS REQUIRED  Pre-D: Not required  Exclusions/Restrictions: Based on medical necessity  Covered Benefit: Based on medical necessity  Authorization dates: N/A  CPT codes approved: 32267  Number of visits/dates of service approved: 1  Physician: Nicole  [facility]        Patient can be scheduled.

## 2024-04-26 NOTE — PROGRESS NOTES
Patient presents in office today with reported pain in lower back, right hip and thigh pain.   Referred by Dr. Guerra    Current pain level reported = 1/10: 8/10 at its worst.     Last reported dose of Advil this morning.        Narcotic Contract renewal NA      PT had a L ultrasound guided hip injection on 03.20.24    Sitting and driving can cause an increase in pain. PT is in physical therapy.     No recent falls.

## 2024-04-26 NOTE — TELEPHONE ENCOUNTER
PATIENTS PRIMARY COVERAGE IS MEDICARE   NO PRIOR AUTHORIZATION IS REQUIRED   Patient can be scheduled.     Order Information    Encounter Summary  Order Details  Procedure: [facility] PAIN NAVIGATOR   Associated diagnoses: M54.16 (ICD-10-CM) - 724.4 (ICD-9-CM) - Lumbar radiculopathy  M48.061 (ICD-10-CM) - 724.02 (ICD-9-CM) - Spinal stenosis of lumbar region without neurogenic claudication   Date: 4/26/2024   Provider: Yeimy Rivera DO   Department: [facility] PAIN [facility]   Progress Note  Office Visit with MARYCRUZ OLIVO     Order Questions    Question Answer Comment   Anesthesia Type Local    Provider Nicole    Location [facility]    Procedure Lumbar CHARLEEN L4-5   CPT (Hit enter after each entry) NJX INTERLAMINAR LMBR/SAC [37592]    C-ARM Yes    Implants No    Medical clearance requested (will send to Pain Navigator) No    Patient has Medicare coverage? Yes

## 2024-04-26 NOTE — PATIENT INSTRUCTIONS
Refill policies:    Allow 2-3 business days for refills; controlled substances may take longer.  Contact your pharmacy at least 5 days prior to running out of medication and have them send an electronic request or submit request through the “request refill” option in your [employer] account.  Refills are not addressed on weekends; covering physicians do not authorize routine medications on weekends.  No narcotics or controlled substances are refilled after noon on Fridays or by on call physicians.  By law, narcotics must be electronically prescribed.  A 30 day supply with no refills is the maximum allowed.  If your prescription is due for a refill, you may be due for a follow up appointment.  To best provide you care, patients receiving routine medications need to be seen at least once a year.  Patients receiving narcotic/controlled substance medications need to be seen at least once every 3 months.  In the event that your preferred pharmacy does not have the requested medication in stock (e.g. Backordered), it is your responsibility to find another pharmacy that has the requested medication available.  We will gladly send a new prescription to that pharmacy at your request.    Scheduling Tests:    If your physician has ordered radiology tests such as MRI or CT scans, please contact Central Scheduling at [phone] right away to schedule the test.  Once scheduled, the [facility]iatric hospital Centralized Referral Team will work with your insurance carrier to obtain pre-certification or prior authorization.  Depending on your insurance carrier, approval may take 3-10 days.  It is highly recommended patients assure they have received an authorization before having a test performed.  If test is done without insurance authorization, patient may be responsible for the entire amount billed.      Precertification and Prior Authorizations:  If your physician has recommended that you have a procedure or additional testing performed the Psychiatric hospital  Centralized Referral Team will contact your insurance carrier to obtain pre-certification or prior authorization.    You are strongly encouraged to contact your insurance carrier to verify that your procedure/test has been approved and is a COVERED benefit.  Although the [facility] Centralized Referral Team does its due diligence, the insurance carrier gives the disclaimer that \"Although the procedure is authorized, this does not guarantee payment.\"    Ultimately the patient is responsible for payment.   Thank you for your understanding in this matter.  Paperwork Completion:  If you require FMLA or disability paperwork for your recovery, please make sure to either drop it off or have it faxed to our office at [phone]. Be sure the form has your name and date of birth on it.  The form will be faxed to our Forms Department and they will complete it for you.  There is a 25$ fee for all forms that need to be filled out.  Please be aware there is a 10-14 day turnaround time.  You will need to sign a release of information (BRIGITTE) form if your paperwork does not come with one.  You may call the Forms Department with any questions at [phone].  Their fax number is [phone].

## 2024-04-26 NOTE — CHRONIC PAIN
History of Present Illness:    Marlee Rush is a 68 year old old female, originally referred to the pain clinic by Dr. Guerra, returns to the clinic for *** which began ***.  The pain radiates into the ***.  ***No specific inciting event recalled.  No*** recent injury/trauma.  The pain is *** in character, and ***/10 usually.  Pt reports that the pain is worse with ***.  The pain is better with ***.  The pain has been getting *** worse steadily.  Pt denies numbness/tingling/weakness.  There is ***no incontinence of bowel/bladder.      Pain Course and Previous Interventions:  Medications:  ***  Interventions:  ***  Adjuvants:  ***    Blood Thinning Medications:  None***    Current Medications:  Current Outpatient Medications   Medication Sig Dispense Refill    alendronate 70 MG Oral Tab Take 1 tablet (70 mg total) by mouth once a week. Take once weekly in AM, at least 30 minutes before the first food, beverage, or medication of the day. 12 tablet 2    gabapentin 300 MG Oral Cap Take 1 capsule (300 mg total) by mouth 3 (three) times daily. 90 capsule 1    diclofenac 75 MG Oral Tab EC Take 1 tablet (75 mg total) by mouth 2 (two) times daily. 28 tablet 1    triamcinolone 0.1 % External Cream Apply topically 2 (two) times daily as needed. 30 g 0    CALCIUM OR Take by mouth.      Probiotic Product (PROBIOTIC ADVANCED) Oral Cap Take by mouth.      Multiple Vitamin (MULTI-VITAMIN DAILY) Oral Tab Take 1 tablet by mouth daily.      Omega-3 Fatty Acids (FISH OIL CONCENTRATE OR) Take 1 capsule by mouth daily.      Cholecalciferol (VITAMIN D3) 2000 units Oral Tab Take 1 capsule by mouth daily.      Vitamin C (VITAMIN C) 500 MG Oral Tab Take  by mouth.      cholecalciferol 25 MCG (1000 UT) Oral Cap Take by mouth.      cyclobenzaprine 5 MG Oral Tab Take 1-2 tablets (5-10 mg total) by mouth 3 (three) times daily as needed for Muscle spasms. May cause sedation; no driving. DO NOT TAKE WITH HYDROCODONE. (Patient not taking: Reported  on 3/13/2024) 90 tablet 1    HYDROcodone-acetaminophen (NORCO) 5-325 MG Oral Tab Take 1 tablet by mouth 2 (two) times daily as needed for Pain (FOR SEVERE PAIN.). DO NOT TAKE WITH CYCLOBENZAPRINE. (Patient not taking: Reported on 10/30/2023) 12 tablet 0        Allergies:  No Known Allergies     Review of Systems:  Bowel/Bladder Incontinence:  As above  Coughing/Sneezing/Straining does ***not exacerbate the pain.  Numbness/tingling:  As above  Weakness:  As above  Weight Loss:  Negative  Fever:  Negative  Cardiovascular:  No current chest pain or palpitations  Respiratory:  No current shortness of breath  Gastrointestinal:  No active ulcer  Genitourinary:  Negative  Integumentary:  Negative  Psychiatric:  Negative  Hematologic:  No active bleeding  Lymphatic:  No current lymphedema  Allergic/Immunologic:  Negative  Musculoskeletal:  As above  Neurological:  As above  Denies chest pain, shortness of breath.    Medical History:  Patient Active Problem List   Diagnosis    Hyperlipidemia    Encounter for screening mammogram for breast cancer    Thyroid nodule    Vitamin D deficiency    Acute pain of left knee    Kyphosis of thoracic region    Osteopenia of multiple sites    Medicare annual wellness visit, subsequent    Screening for colon cancer    Age-related osteoporosis without current pathological fracture    Screening for heart disease    Foreign body finger        Past Medical History:    Obesity    Thyroid nodule       Surgical History:  Past Surgical History:   Procedure Laterality Date    Colonoscopy      egd and colonoscopy 2012    Tubal ligation Bilateral 1987        Family History:   Family History   Problem Relation Age of Onset    Diabetes Father     Heart Disorder Father     Heart Disorder Mother     Diabetes Sister         Social History:  Social History     Socioeconomic History    Marital status:      Spouse name: Not on file    Number of children: Not on file    Years of education: Not on file     Highest education level: Not on file   Occupational History    Not on file   Tobacco Use    Smoking status: Former     Passive exposure: Past    Smokeless tobacco: Never    Tobacco comments:     quit smoking 1991   Vaping Use    Vaping status: Never Used   Substance and Sexual Activity    Alcohol use: No     Alcohol/week: 0.0 standard drinks of alcohol    Drug use: No    Sexual activity: Not on file   Other Topics Concern     Service Not Asked    Blood Transfusions Not Asked    Caffeine Concern Yes     Comment: Coffee 1 cup daily; tea    Occupational Exposure Not Asked    Hobby Hazards Not Asked    Sleep Concern Not Asked    Stress Concern Not Asked    Weight Concern Not Asked    Special Diet Not Asked    Back Care Not Asked    Exercise Yes    Bike Helmet Not Asked    Seat Belt Not Asked    Self-Exams Not Asked   Social History Narrative    Not on file     Social Determinants of Health     Financial Resource Strain: Not on file   Food Insecurity: Not on file   Transportation Needs: Not on file   Physical Activity: Not on file   Stress: Not on file   Social Connections: Not on file   Housing Stability: At Risk (8/27/2023)    Received from [facility]     Living Situation: Not on file     Housing Problems: Not on file        Physical Examination:  Vitals:    04/26/24 0848   BP: 129/82   Pulse: 60        General:  Alert and oriented x3  Affect:  NAD  Head:  Normocephalic, atraumatic  Eyes:  ***anicteric; no injection  CV:  RRR, no m/r/g  Respiratory:  CTAB ***  Gait:  ***, cane user:  {yes/default no:0724264::\"no\"}  Spine:  ***    ROM:    LUMBAR SPINE    Degree Pain   Flexion ***90 No   Extension 30 No   Left SB 30 No   Right SB 30 No   Left SB/E 30 No   Right SB/E 30 No     CERVICAL SPINE    Degree Pain   Flexion ***45 No   Extension 30 No   Left SB 30 No   Right SB 30 No   Left Rotation 80 No   Right Rotation 80 No     KNEES    Degree Pain   Right Flexion ***120 No   Right Extension 0 No    Left Flexion 120 No   Left Extension 0 No     SHOULDERS    LEFT Pain RIGHT Pain   Flexion ***180 No 180 No   Abduction 180 No 180 No   Internal Rotation T9 No T9 No   External Rotation T2 No T2 No     MOTOR EXAMINATION:    UPPER EXTREMITY    LEFT RIGHT   Deltoid ***5/5 5/5   Biceps 5/5 5/5   Triceps 5/5 5/5   Brachioradialis 5/5 5/5   Wrist Flexors 5/5 5/5   Wrist Extensors 5/5 5/5   Intrinsic Hand 5/5 5/5    5/5 5/5     LOWER EXTREMITY    LEFT RIGHT   Iliopsoas ***5/5 5/5   Quadriceps 5/5 5/5   Foot DF 5/5 5/5   Foot EHL 5/5 5/5   Gastrocnemius 5/5 5/5     PULSES    LEFT RIGHT   Radial ***2/4 2/4   Dorsalis Pedis 2/4 2/4   Posterior Tibial 2/4 2/4   Brachial 2/4 2/4     SLR:  ***negative  SIJ tenderness:  ***negative  Cony's test:  ***negative  Gaenslen's Test:  ***negative  Joint Exam:  ***  TPs:  ***    Right Deep tendon reflexes:  ***  Left Deep tendon reflexes:  ***  Babinski Reflex:  Absent bilaterally***  Temperature:  ***normal to touch bilateral upper and lower extremities  Edema:  ***  Skin - normal ***    Sensation (light touch/pinprick/temperature):  Right Lower Extremity:  ***  Left Lower Extremity:  ***  Right Upper Extremity:  ***  Left Upper Extremity:  ***    IMAGING:  ***No new relevant studies    ASSESSMENT:  68 year old old female with ***    PLAN:  RECOMMENDATIONS:  1)  Medical Modalities: ***  2)  Interventional Modalities: ***  3)  Behavioral Medicine Modalities:  *** none  4)  Other Modalities:  *** Continue HEP  5)  Imaging/Labs:  ***    Pt will return to clinic for *** followup in ***4 weeks.     examination, the lowest fully formed disc space is designated L5-S1.    BONES: Levoscoliosis.  No acute fracture or malalignment.  Multilevel degenerative change with marginal osteophyte formation and facet arthropathy.  Degenerative endplate marrow signal changes noted including L3-L4.  CORD/CAUDA EQUINA: Normal caliber, contour, and signal intensity.  The conus terminates at T12-L1.  PARASPINAL AREA: Unremarkable.    OTHER: Small T2 hyperintense focus upper pole right kidney incompletely characterized but may reflect a cyst.  Small additional probable bilateral renal parapelvic cysts.     LUMBAR DISC LEVELS:  L1-L2: Circumferential disc bulge.  No significant central canal narrowing.  No significant foraminal narrowing.  L2-L3: Circumferential disc bulge with suspected bilateral superimposed left greater than right paracentral protrusions.  Mild ligamentum flavum hypertrophy.  Mild asymmetric central canal narrowing particularly on the left.  Mild bilateral foraminal  narrowing with facet arthropathy.  L3-L4: Circumferential disc bulge with mild ligamentum flavum hypertrophy.  Mild central canal narrowing.  Mild-to-moderate bilateral foraminal narrowing with facet arthropathy.  L4-L5: Circumferential disc bulge with mild ligamentum flavum hypertrophy.  Mild-to-moderate central canal narrowing.  Moderate left and mild-to-moderate right foraminal narrowing with facet arthropathy.  L5-S1: Circumferential disc bulge.  Mild prominence of the epidural fat.  Mild central canal narrowing.  Moderate left foraminal narrowing with facet arthropathy.  Mild right foraminal narrowing with facet arthropathy.               Impression   CONCLUSION:     Lumbar levoscoliosis with multilevel lumbar spine degenerative change as described above.  This includes mild-to-moderate central canal narrowing L4-L5 as well as mild L2-L3, L3-L4 and L5-S1 central canal narrowing with additional foraminal narrowing as  above.           Dictated by  (CST): Renny Conroy MD on 3/23/2024 at 3:54 PM      Finalized by (CST): Renny Conroy MD on 3/23/2024 at 4:03 PM             ASSESSMENT:  68 year old female with low back pain and left leg radicular pain due to multilevel disc disease, foraminal narrowing and spinal canal narrowing.    Attempted conservative treatments such as medications and PT without much symptom improvement.    PLAN:  RECOMMENDATIONS:  1)  Medical Modalities: continue home medications; no new Rx given  2)  Interventional Modalities: lumbar interlaminar epidural steroid injection at L4-5   3)  Other Modalities:   Continue HEP/PT   5)  Imaging/Labs:  none indicated     Pt will return to clinic for  followup 2 weeks after the injection       Time spent: 36 minutes       Yeimy Rivera DO  Anesthesiology  Pain Medicine

## 2024-04-29 DIAGNOSIS — M48.061 SPINAL STENOSIS, LUMBAR REGION, WITHOUT NEUROGENIC CLAUDICATION: ICD-10-CM

## 2024-04-29 DIAGNOSIS — M54.16 LUMBAR RADICULOPATHY: Primary | ICD-10-CM

## 2024-04-29 NOTE — TELEPHONE ENCOUNTER
Spoke with Marlee (patient)     Procedure with  on 05/22/24  Post procedure follow up with  on 06/06/24    Case placed and signed.

## 2024-05-01 ENCOUNTER — OFFICE VISIT (OUTPATIENT)
Dept: ORTHOPEDICS CLINIC | Facility: CLINIC | Age: 69
End: 2024-05-01
Payer: MEDICARE

## 2024-05-01 DIAGNOSIS — M48.061 SPINAL STENOSIS OF LUMBAR REGION WITHOUT NEUROGENIC CLAUDICATION: ICD-10-CM

## 2024-05-01 DIAGNOSIS — M17.12 PRIMARY OSTEOARTHRITIS OF LEFT KNEE: Primary | ICD-10-CM

## 2024-05-01 DIAGNOSIS — M43.06 LUMBAR SPONDYLOLYSIS: ICD-10-CM

## 2024-05-01 DIAGNOSIS — M54.16 LUMBAR RADICULOPATHY: ICD-10-CM

## 2024-05-01 RX ORDER — TRAMADOL HYDROCHLORIDE 50 MG/1
TABLET ORAL
Qty: 10 TABLET | Refills: 1 | Status: SHIPPED | OUTPATIENT
Start: 2024-05-01

## 2024-05-02 RX ORDER — KETOROLAC TROMETHAMINE 30 MG/ML
30 INJECTION, SOLUTION INTRAMUSCULAR; INTRAVENOUS ONCE
Status: SHIPPED | OUTPATIENT
Start: 2024-05-02 | End: 2024-05-04

## 2024-05-02 RX ORDER — TRIAMCINOLONE ACETONIDE 40 MG/ML
40 INJECTION, SUSPENSION INTRA-ARTICULAR; INTRAMUSCULAR ONCE
Status: SHIPPED | OUTPATIENT
Start: 2024-05-02

## 2024-05-02 NOTE — H&P
Sports Medicine Clinic Note     Subjective:    Chief Complaint: Left knee pain. Ongoing low back pain.    HPI: This is a pleasant 68 year old female who presents for follow up and to discuss left knee pain with me as there is worsening pain in this region.  Since the last visit she reports ongoing low back pain with no improvement after p.o. analgesics and trial of gabapentin.  She has seen pain medicine since her last visit with me and states that they have LESI planned later this month.    She primarily wants to discuss left knee pain present for the last several years and has responded well to injection therapy in the past (last about 4 months) and requests repeat injection today. The pain is described as aching in quality.     - The pain is described as a deep, aching sensation.   - The pain seems to be aggravated by prolonged walking or standing and seems to improve with rest.   - There is no history of any acute trauma or injury.   - The patient mentions occasional morning stiffness which improves with activity.   - The patient does not report any locking, catching, or giving way of the knee.     Objective:    Left Knee Exam:    Inspection:  Antalgic gait.  Valgus alignment.  No appreciable muscle atrophy.  No warmth, erythema, or effusion.    Palpation:  Tenderness over the medial joint line, lateral joint line, patella, with patellofemoral compression  No tenderness over the tibial tubercle, suprapatellar pouch, pes anserine bursa, popliteal fossa, distal IT band, or other compartments of the leg.  Flexion from 0-110 degrees.  Extensor mechanism intact and without palpable defects.  No audible crepitus.    Neurovascular  SILT S / S / SP / DP / Tib nerve distributions. Fires quad / hamstrings / GSC / TA / EHL.  2+ DP & PT pulses, brisk cap refill    Special  Normal patellar tracking  No laxity/opening to varus/valgus force at both 30 and 0 degrees  Negative Pivot Shift  Negative Lachman  Negative Posterior  Drawer  Negative Rachana    Diagnostic Studies:    Radiographs bilateral knees from 8/14/23 personally reviewed in office and no apparent fracture or dislocation seen. Tricompartmental oint space narrowing with marginal osteophytes are noted most pronounced in the lateral tibiofemoral compartment.    Assessment:    68 year old female with clinical history and examination consistent with    Primary osteoarthritis, left knee  Lumbar radiculopathy pending LESI with pain management    Plan:    The patient is provided with a PT order focusing on quadriceps strengthening and mobility exercises but she defers this for the time being. Recommend medications such as Tylenol for pain management. After a comprehensive discussion about the potential benefits and limitations of injection therapy as a treatment option for the suspected diagnosis, the patient opted to proceed with a therapeutic injection during today's office visit. Activity is advised to be limited to tolerance. Tentative follow-up scheduled in 2-4 weeks to reassess the treatment plan and adapt as necessary. Hopefully will have positive response and complete LESI by that time.    Procedure Note:    After discussion of the risks and benefits, the patient elected to proceed with a therapeutic injection into the left knee. Confirmed that the patient does not have history of prior adverse reactions, active infections, or relevant allergies. There was no erythema or warmth, and the skin was clear.    The skin was sterilized with ChloraPrep. A 22 gauge needle was inserted via inferolateral approach. The site was injected with a mixture of 1 mL of kenalog 40 mg/mL, 1 mL of Toradol 30 mg/mL and 1 mL of 1% Lidocaine without Epinephrine. The injection was completed without complication, and a bandage was applied.    The patient tolerated the procedure well and was instructed to avoid strenuous activity for the next 24-48 hours and to use ice or Tylenol for pain as needed.  The patient will call immediately with any signs of infection or allergic reaction.    POST-INJECTION CARE: The patient tolerated the procedure well. An occlusive bandage was placed over the injection site. Post-injection care instructions provided to the patient. The patient was asked to avoid strenuous activity and continue to rest the area for 2-3 days before resuming regular activities. Patient advised that the area may be more painful for the first 1-2 days. They can use ice or Tylenol for pain as needed.  Patient was instructed to watch for fever, increased swelling, or persistent pain. The patient will call immediately with any signs of infection or allergic reaction.    COMPLICATIONS: The patient tolerated the procedure well without any complications.        Gonzalo Guerra DO, CAQSM   Primary Care Sports Medicine    Department of Orthopaedic Surgery  [address] [address]   [address] [place] [id number]    t: [phone]  f: [phone]      [email or web address]

## 2024-06-06 ENCOUNTER — TELEPHONE (OUTPATIENT)
Dept: INTERNAL MEDICINE CLINIC | Facility: CLINIC | Age: 69
End: 2024-06-06

## 2024-06-06 ENCOUNTER — OFFICE VISIT (OUTPATIENT)
Dept: PAIN CLINIC | Facility: HOSPITAL | Age: 69
End: 2024-06-06
Attending: ANESTHESIOLOGY

## 2024-06-06 VITALS
BODY MASS INDEX: 37 KG/M2 | SYSTOLIC BLOOD PRESSURE: 116 MMHG | HEART RATE: 65 BPM | OXYGEN SATURATION: 96 % | WEIGHT: 210 LBS | DIASTOLIC BLOOD PRESSURE: 75 MMHG

## 2024-06-06 DIAGNOSIS — R92.8 ABNORMAL MAMMOGRAM: Primary | ICD-10-CM

## 2024-06-06 DIAGNOSIS — M54.42 ACUTE LEFT-SIDED LOW BACK PAIN WITH LEFT-SIDED SCIATICA: Primary | ICD-10-CM

## 2024-06-06 DIAGNOSIS — M41.9 SCOLIOSIS OF THORACOLUMBAR SPINE, UNSPECIFIED SCOLIOSIS TYPE: ICD-10-CM

## 2024-06-06 DIAGNOSIS — M54.17 LUMBOSACRAL RADICULOPATHY: ICD-10-CM

## 2024-06-06 DIAGNOSIS — M48.07 SPINAL STENOSIS OF LUMBOSACRAL REGION: ICD-10-CM

## 2024-06-06 PROCEDURE — 99211 OFF/OP EST MAY X REQ PHY/QHP: CPT

## 2024-06-06 NOTE — PROGRESS NOTES
Last procedure: L4/5 LUMBAR EPIDURAL STEROID INJECTION   Date: 5/22/24  Percentage of relief obtained: 90%  Duration of relief: Consistent    Current Pain Score: 0/10    Narcotic Contract Exp: NA

## 2024-06-06 NOTE — PATIENT INSTRUCTIONS
Refill policies:    Allow 2-3 business days for refills; controlled substances may take longer.  Contact your pharmacy at least 5 days prior to running out of medication and have them send an electronic request or submit request through the “request refill” option in your [employer] account.  Refills are not addressed on weekends; covering physicians do not authorize routine medications on weekends.  No narcotics or controlled substances are refilled after noon on Fridays or by on call physicians.  By law, narcotics must be electronically prescribed.  A 30 day supply with no refills is the maximum allowed.  If your prescription is due for a refill, you may be due for a follow up appointment.  To best provide you care, patients receiving routine medications need to be seen at least once a year.  Patients receiving narcotic/controlled substance medications need to be seen at least once every 3 months.  In the event that your preferred pharmacy does not have the requested medication in stock (e.g. Backordered), it is your responsibility to find another pharmacy that has the requested medication available.  We will gladly send a new prescription to that pharmacy at your request.    Scheduling Tests:    If your physician has ordered radiology tests such as MRI or CT scans, please contact Central Scheduling at [phone] right away to schedule the test.  Once scheduled, the [facility] Centralized Referral Team will work with your insurance carrier to obtain pre-certification or prior authorization.  Depending on your insurance carrier, approval may take 3-10 days.  It is highly recommended patients assure they have received an authorization before having a test performed.  If test is done without insurance authorization, patient may be responsible for the entire amount billed.      Precertification and Prior Authorizations:  If your physician has recommended that you have a procedure or additional testing performed the [facility]  Centralized Referral Team will contact your insurance carrier to obtain pre-certification or prior authorization.    You are strongly encouraged to contact your insurance carrier to verify that your procedure/test has been approved and is a COVERED benefit.  Although the [facility] Centralized Referral Team does its due diligence, the insurance carrier gives the disclaimer that \"Although the procedure is authorized, this does not guarantee payment.\"    Ultimately the patient is responsible for payment.   Thank you for your understanding in this matter.  Paperwork Completion:  If you require FMLA or disability paperwork for your recovery, please make sure to either drop it off or have it faxed to our office at [phone]. Be sure the form has your name and date of birth on it.  The form will be faxed to our Forms Department and they will complete it for you.  There is a 25$ fee for all forms that need to be filled out.  Please be aware there is a 10-14 day turnaround time.  You will need to sign a release of information (BRIGITTE) form if your paperwork does not come with one.  You may call the Forms Department with any questions at [phone].  Their fax number is [phone].

## 2024-06-06 NOTE — TELEPHONE ENCOUNTER
Spoke with patient, verified , informed patient that mammogram and breast ultrasound orders have been placed and provided patient with phone number for central scheduling. Patient verbalized understanding.

## 2024-06-06 NOTE — CHRONIC PAIN
History of Present Illness:    Marlee Rush is a 68 year old old female, originally referred to the pain clinic by Dr. Carlson, returns to the clinic for *** which began ***.  The pain radiates into the ***.  ***No specific inciting event recalled.  No*** recent injury/trauma.  The pain is *** in character, and ***/10 usually.  Pt reports that the pain is worse with ***.  The pain is better with ***.  The pain has been getting *** worse steadily.  Pt denies numbness/tingling/weakness.  There is ***no incontinence of bowel/bladder.      Pain Course and Previous Interventions:  Medications:  ***  Interventions:  ***  Adjuvants:  ***    Blood Thinning Medications:  None***    Current Medications:  Current Outpatient Medications   Medication Sig Dispense Refill    traMADol 50 MG Oral Tab Please take 1 to 2 tablets every 4 hours as needed for pain. 10 tablet 1    alendronate 70 MG Oral Tab Take 1 tablet (70 mg total) by mouth once a week. Take once weekly in AM, at least 30 minutes before the first food, beverage, or medication of the day. 12 tablet 2    diclofenac 75 MG Oral Tab EC Take 1 tablet (75 mg total) by mouth 2 (two) times daily. 28 tablet 1    triamcinolone 0.1 % External Cream Apply topically 2 (two) times daily as needed. 30 g 0    cyclobenzaprine 5 MG Oral Tab Take 1-2 tablets (5-10 mg total) by mouth 3 (three) times daily as needed for Muscle spasms. May cause sedation; no driving. DO NOT TAKE WITH HYDROCODONE. 90 tablet 1    CALCIUM OR Take by mouth.      Probiotic Product (PROBIOTIC ADVANCED) Oral Cap Take by mouth.      Multiple Vitamin (MULTI-VITAMIN DAILY) Oral Tab Take 1 tablet by mouth daily.      Omega-3 Fatty Acids (FISH OIL CONCENTRATE OR) Take 1 capsule by mouth daily.      Cholecalciferol (VITAMIN D3) 2000 units Oral Tab Take 1 capsule by mouth daily.      Vitamin C (VITAMIN C) 500 MG Oral Tab Take  by mouth.      cholecalciferol 25 MCG (1000 UT) Oral Cap Take by mouth.       HYDROcodone-acetaminophen (NORCO) 5-325 MG Oral Tab Take 1 tablet by mouth 2 (two) times daily as needed for Pain (FOR SEVERE PAIN.). DO NOT TAKE WITH CYCLOBENZAPRINE. (Patient not taking: Reported on 10/30/2023) 12 tablet 0        Allergies:  No Known Allergies     Review of Systems:  Bowel/Bladder Incontinence:  As above  Coughing/Sneezing/Straining does ***not exacerbate the pain.  Numbness/tingling:  As above  Weakness:  As above  Weight Loss:  Negative  Fever:  Negative  Cardiovascular:  No current chest pain or palpitations  Respiratory:  No current shortness of breath  Gastrointestinal:  No active ulcer  Genitourinary:  Negative  Integumentary:  Negative  Psychiatric:  Negative  Hematologic:  No active bleeding  Lymphatic:  No current lymphedema  Allergic/Immunologic:  Negative  Musculoskeletal:  As above  Neurological:  As above  Denies chest pain, shortness of breath.    Medical History:  Patient Active Problem List   Diagnosis    Hyperlipidemia    Encounter for screening mammogram for breast cancer    Thyroid nodule    Vitamin D deficiency    Acute pain of left knee    Kyphosis of thoracic region    Osteopenia of multiple sites    Medicare annual wellness visit, subsequent    Screening for colon cancer    Age-related osteoporosis without current pathological fracture    Screening for heart disease    Foreign body finger        Past Medical History:    High cholesterol    Obesity    Thyroid nodule    Visual impairment       Surgical History:  Past Surgical History:   Procedure Laterality Date    Colonoscopy      egd and colonoscopy 2012    Tubal ligation Bilateral 1987        Family History:   Family History   Problem Relation Age of Onset    Diabetes Father     Heart Disorder Father     Heart Disorder Mother     Diabetes Sister         Social History:  Social History     Socioeconomic History    Marital status:      Spouse name: Not on file    Number of children: Not on file    Years of education:  Not on file    Highest education level: Not on file   Occupational History    Not on file   Tobacco Use    Smoking status: Former     Passive exposure: Past    Smokeless tobacco: Never    Tobacco comments:     quit smoking 1991   Vaping Use    Vaping status: Never Used   Substance and Sexual Activity    Alcohol use: No     Alcohol/week: 0.0 standard drinks of alcohol    Drug use: No    Sexual activity: Not on file   Other Topics Concern     Service Not Asked    Blood Transfusions Not Asked    Caffeine Concern Yes     Comment: Coffee 1 cup daily; tea    Occupational Exposure Not Asked    Hobby Hazards Not Asked    Sleep Concern Not Asked    Stress Concern Not Asked    Weight Concern Not Asked    Special Diet Not Asked    Back Care Not Asked    Exercise Yes    Bike Helmet Not Asked    Seat Belt Not Asked    Self-Exams Not Asked   Social History Narrative    Not on file     Social Determinants of Health     Financial Resource Strain: Not on file   Food Insecurity: Not on file   Transportation Needs: Not on file   Physical Activity: Not on file   Stress: Not on file   Social Connections: Not on file   Housing Stability: At Risk (8/27/2023)    [facility]     Living Situation: Not on file     Housing Problems: Not on file        Physical Examination:  Vitals:    06/06/24 1134   BP: 116/75   Pulse: 65        General:  Alert and oriented x3  Affect:  NAD  Head:  Normocephalic, atraumatic  Eyes:  ***anicteric; no injection  CV:  RRR, no m/r/g  Respiratory:  CTAB ***  Gait:  ***, cane user:  {yes/default no:[id number]::\"no\"}  Spine:  ***    ROM:    LUMBAR SPINE    Degree Pain   Flexion ***90 No   Extension 30 No   Left SB 30 No   Right SB 30 No   Left SB/E 30 No   Right SB/E 30 No     CERVICAL SPINE    Degree Pain   Flexion ***45 No   Extension 30 No   Left SB 30 No   Right SB 30 No   Left Rotation 80 No   Right Rotation 80 No     KNEES    Degree Pain   Right Flexion ***120 No   Right  Extension 0 No   Left Flexion 120 No   Left Extension 0 No     SHOULDERS    LEFT Pain RIGHT Pain   Flexion ***180 No 180 No   Abduction 180 No 180 No   Internal Rotation T9 No T9 No   External Rotation T2 No T2 No     MOTOR EXAMINATION:    UPPER EXTREMITY    LEFT RIGHT   Deltoid ***5/5 5/5   Biceps 5/5 5/5   Triceps 5/5 5/5   Brachioradialis 5/5 5/5   Wrist Flexors 5/5 5/5   Wrist Extensors 5/5 5/5   Intrinsic Hand 5/5 5/5    5/5 5/5     LOWER EXTREMITY    LEFT RIGHT   Iliopsoas ***5/5 5/5   Quadriceps 5/5 5/5   Foot DF 5/5 5/5   Foot EHL 5/5 5/5   Gastrocnemius 5/5 5/5     PULSES    LEFT RIGHT   Radial ***2/4 2/4   Dorsalis Pedis 2/4 2/4   Posterior Tibial 2/4 2/4   Brachial 2/4 2/4     SLR:  ***negative  SIJ tenderness:  ***negative  Cony's test:  ***negative  Gaenslen's Test:  ***negative  Joint Exam:  ***  TPs:  ***    Right Deep tendon reflexes:  ***  Left Deep tendon reflexes:  ***  Babinski Reflex:  Absent bilaterally***  Temperature:  ***normal to touch bilateral upper and lower extremities  Edema:  ***  Skin - normal ***    Sensation (light touch/pinprick/temperature):  Right Lower Extremity:  ***  Left Lower Extremity:  ***  Right Upper Extremity:  ***  Left Upper Extremity:  ***    IMAGING:  ***No new relevant studies    ASSESSMENT:  68 year old old female with ***    PLAN:  RECOMMENDATIONS:  1)  Medical Modalities: ***  2)  Interventional Modalities: ***  3)  Behavioral Medicine Modalities:  *** none  4)  Other Modalities:  *** Continue HEP  5)  Imaging/Labs:  ***    Pt will return to clinic for *** followup in ***4 weeks.     narrowing.  L2-L3: Circumferential disc bulge with suspected bilateral superimposed left greater than right paracentral protrusions.  Mild ligamentum flavum hypertrophy.  Mild asymmetric central canal narrowing particularly on the left.  Mild bilateral foraminal  narrowing with facet arthropathy.  L3-L4: Circumferential disc bulge with mild ligamentum flavum hypertrophy.  Mild central canal narrowing.  Mild-to-moderate bilateral foraminal narrowing with facet arthropathy.  L4-L5: Circumferential disc bulge with mild ligamentum flavum hypertrophy.  Mild-to-moderate central canal narrowing.  Moderate left and mild-to-moderate right foraminal narrowing with facet arthropathy.  L5-S1: Circumferential disc bulge.  Mild prominence of the epidural fat.  Mild central canal narrowing.  Moderate left foraminal narrowing with facet arthropathy.  Mild right foraminal narrowing with facet arthropathy.               Impression   CONCLUSION:     Lumbar levoscoliosis with multilevel lumbar spine degenerative change as described above.  This includes mild-to-moderate central canal narrowing L4-L5 as well as mild L2-L3, L3-L4 and L5-S1 central canal narrowing with additional foraminal narrowing as  above.           Dictated by (CST): Renny Conroy MD on 3/23/2024 at 3:54 PM      Finalized by (CST): Renny Conroy MD on 3/23/2024 at 4:03 PM                 ASSESSMENT:  68 year old female with sudden onset of low back pain with radiation into the LLE due to multilevel degenerative changes resulting in central canal narrowing and foraminal narrowing.    PLAN:  RECOMMENDATIONS:  1)  Medical Modalities: cont tramadol prn, cont diclofenac prn   2)  Interventional Modalities: none indicated at this time; repeat when needed   3)  Other Modalities:  resume physical therapy   4)  Application for disability placard filled out today     Pt will return to clinic for  followup in 8 weeks.    Time spent: 18 minutes     Yeimy Rivera  DO  Anesthesiology  Pain Medicine

## 2024-06-07 ENCOUNTER — NURSE TRIAGE (OUTPATIENT)
Dept: INTERNAL MEDICINE CLINIC | Facility: CLINIC | Age: 69
End: 2024-06-07

## 2024-06-07 NOTE — TELEPHONE ENCOUNTER
Patient contacted. Verified name and date of birth.  Informed her of Dr. Coats's message and verbalized understanding.

## 2024-06-07 NOTE — TELEPHONE ENCOUNTER
Action Requested: Summary for Provider     []  Critical Lab, Recommendations Needed  [x] Need Additional Advice  []   FYI    []   Need Orders  [] Need Medications Sent to Pharmacy  []  Other     SUMMARY: Dr. Carlson Patient stated that on Wed she lifted a heavy garbage bag and since then she has been having chest pain. The chest pain is only there when she does a certain movement. Patient strongly feels she  pulled a muscle. Since then when she moves a certain way she feel chest pain about a 6/10. If she does not move she does not have chest pain. Patient denied any shortness of breath. Patient will like to know for a muscle pull what can she take that will help the best.  At home patient  has Meloxicam,  cyclobenzaprine 5 MG and 10 mg dose and she has  tramadol 50 mg or is it best for her to take Advil or Aleve. Please advise     Onset:Wed    Reason for Disposition   Chest pain(s) lasting a few seconds persists > 3 days    Protocols used: Chest Pain-A-[place]

## 2024-06-11 ENCOUNTER — TELEPHONE (OUTPATIENT)
Dept: INTERNAL MEDICINE CLINIC | Facility: CLINIC | Age: 69
End: 2024-06-11

## 2024-06-11 NOTE — TELEPHONE ENCOUNTER
Plan of care received from [facility] dated DOS-6/10/2024 placed on Dr. ANITA kee for signature and review.

## 2024-06-18 ENCOUNTER — MED REC SCAN ONLY (OUTPATIENT)
Dept: INTERNAL MEDICINE CLINIC | Facility: CLINIC | Age: 69
End: 2024-06-18

## 2024-06-19 ENCOUNTER — LAB ENCOUNTER (OUTPATIENT)
Dept: LAB | Facility: HOSPITAL | Age: 69
End: 2024-06-19
Attending: INTERNAL MEDICINE

## 2024-06-19 ENCOUNTER — OFFICE VISIT (OUTPATIENT)
Dept: RHEUMATOLOGY | Facility: CLINIC | Age: 69
End: 2024-06-19

## 2024-06-19 VITALS
WEIGHT: 208 LBS | SYSTOLIC BLOOD PRESSURE: 126 MMHG | DIASTOLIC BLOOD PRESSURE: 80 MMHG | HEART RATE: 64 BPM | HEIGHT: 63 IN | BODY MASS INDEX: 36.86 KG/M2

## 2024-06-19 DIAGNOSIS — M80.00XA AGE-RELATED OSTEOPOROSIS WITH CURRENT PATHOLOGICAL FRACTURE, INITIAL ENCOUNTER: Primary | ICD-10-CM

## 2024-06-19 DIAGNOSIS — E55.9 VITAMIN D DEFICIENCY: ICD-10-CM

## 2024-06-19 DIAGNOSIS — M81.0 AGE-RELATED OSTEOPOROSIS WITHOUT CURRENT PATHOLOGICAL FRACTURE: ICD-10-CM

## 2024-06-19 LAB
ALP LIVER SERPL-CCNC: 92 U/L
ANION GAP SERPL CALC-SCNC: 9 MMOL/L (ref 0–18)
BUN BLD-MCNC: 11 MG/DL (ref 9–23)
BUN/CREAT SERPL: 20.4 (ref 10–20)
CALCIUM BLD-MCNC: 10.2 MG/DL (ref 8.7–10.4)
CHLORIDE SERPL-SCNC: 108 MMOL/L (ref 98–112)
CO2 SERPL-SCNC: 26 MMOL/L (ref 21–32)
CREAT BLD-MCNC: 0.54 MG/DL
EGFRCR SERPLBLD CKD-EPI 2021: 100 ML/MIN/1.73M2 (ref 60–?)
FASTING STATUS PATIENT QL REPORTED: NO
GLUCOSE BLD-MCNC: 80 MG/DL (ref 70–99)
OSMOLALITY SERPL CALC.SUM OF ELEC: 294 MOSM/KG (ref 275–295)
POTASSIUM SERPL-SCNC: 4 MMOL/L (ref 3.5–5.1)
PTH-INTACT SERPL-MCNC: 34.5 PG/ML (ref 18.5–88)
SODIUM SERPL-SCNC: 143 MMOL/L (ref 136–145)
VIT D+METAB SERPL-MCNC: 38.8 NG/ML (ref 30–100)

## 2024-06-19 PROCEDURE — 82306 VITAMIN D 25 HYDROXY: CPT

## 2024-06-19 PROCEDURE — 84075 ASSAY ALKALINE PHOSPHATASE: CPT

## 2024-06-19 PROCEDURE — 36415 COLL VENOUS BLD VENIPUNCTURE: CPT

## 2024-06-19 PROCEDURE — 83521 IG LIGHT CHAINS FREE EACH: CPT

## 2024-06-19 PROCEDURE — 80048 BASIC METABOLIC PNL TOTAL CA: CPT

## 2024-06-19 PROCEDURE — 86334 IMMUNOFIX E-PHORESIS SERUM: CPT

## 2024-06-19 PROCEDURE — 84165 PROTEIN E-PHORESIS SERUM: CPT

## 2024-06-19 PROCEDURE — 83970 ASSAY OF PARATHORMONE: CPT

## 2024-06-19 PROCEDURE — 99214 OFFICE O/P EST MOD 30 MIN: CPT | Performed by: INTERNAL MEDICINE

## 2024-06-19 NOTE — PATIENT INSTRUCTIONS
You are seen today for osteoporosis  Lets repeat the bone density and see where we are at  Could consider Fosamax, Prolia or Evenity depending on the results  Continue calcium and vitamin D  Also get blood work

## 2024-06-19 NOTE — PROGRESS NOTES
Marlee Rush is a 68 year old female.    HPI:     Chief Complaint   Patient presents with    Osteoporosis       I had the pleasure of seeing Marlee Rush on 6/19/2024 for follow up Osteoporosis    Current Medications:  Calcium and vitamin D  Blood work:  +CAROLYNN but repeat in 2012 negative  Neg dsDNA  Bone density 1/2022  LFN T-score    -2.2  LHP                 -0.4  LS                    -0.6    Interval History:  This is a 68 yo F with hx of Osteopenia presents with curvature of her spine.  She has some mild back pain but not severe.  It is on and off.  Can be exacerbated by picking up her grandson.  She is noticed curvature of her spine over the last couple of years and is concerned about this.  She also has some chronic left knee pain.  X-ray of the left knee was normal.  She will be having an MRI of the left knee next week.  She has difficulty ambulating due to her left knee pain.  No swelling.  Denies any other joint pain or swelling.  No rashes, psoriasis or GI issues.  She had a bone density done showing osteopenia in the left hip.  X-ray of the thoracic spine did show mild chronic anterior wedging of T11 and T12 vertebrae's associated with kyphosis..  She started menopause around age 52.  No hormone replacement therapy.  She takes vitamin D 2000 units daily.  No history of fragility fractures.  She does not exercise.  History of smoking but quit 30 years ago.  No excessive alcohol use.    6/18/2024:  Presents for f/u of Osteoporosis  She was prescribed fosamax in 2022 but never took it, was concerned about the s/e   She is also following with pain management for lower back pain and received an epidural injection 5/22 which helped pain but now not walking different so will be starting PT  No recent falls or fractures         HISTORY:  Past Medical History:    High cholesterol    Obesity    Thyroid nodule    Visual impairment      Social Hx Reviewed   Family Hx Reviewed     Medications (Active prior to today's  visit):  Current Outpatient Medications   Medication Sig Dispense Refill    CALCIUM OR Take by mouth.      Probiotic Product (PROBIOTIC ADVANCED) Oral Cap Take by mouth.      Multiple Vitamin (MULTI-VITAMIN DAILY) Oral Tab Take 1 tablet by mouth daily.      Omega-3 Fatty Acids (FISH OIL CONCENTRATE OR) Take 1 capsule by mouth daily.      Cholecalciferol (VITAMIN D3) 2000 units Oral Tab Take 1 capsule by mouth daily.      Vitamin C (VITAMIN C) 500 MG Oral Tab Take  by mouth.      cholecalciferol 25 MCG (1000 UT) Oral Cap Take by mouth.      traMADol 50 MG Oral Tab Please take 1 to 2 tablets every 4 hours as needed for pain. (Patient not taking: Reported on 6/19/2024) 10 tablet 1    alendronate 70 MG Oral Tab Take 1 tablet (70 mg total) by mouth once a week. Take once weekly in AM, at least 30 minutes before the first food, beverage, or medication of the day. (Patient not taking: Reported on 6/19/2024) 12 tablet 2    diclofenac 75 MG Oral Tab EC Take 1 tablet (75 mg total) by mouth 2 (two) times daily. (Patient not taking: Reported on 6/19/2024) 28 tablet 1    triamcinolone 0.1 % External Cream Apply topically 2 (two) times daily as needed. (Patient not taking: Reported on 6/19/2024) 30 g 0    cyclobenzaprine 5 MG Oral Tab Take 1-2 tablets (5-10 mg total) by mouth 3 (three) times daily as needed for Muscle spasms. May cause sedation; no driving. DO NOT TAKE WITH HYDROCODONE. (Patient not taking: Reported on 6/19/2024) 90 tablet 1    HYDROcodone-acetaminophen (NORCO) 5-325 MG Oral Tab Take 1 tablet by mouth 2 (two) times daily as needed for Pain (FOR SEVERE PAIN.). DO NOT TAKE WITH CYCLOBENZAPRINE. (Patient not taking: Reported on 10/30/2023) 12 tablet 0     .cmed  Allergies:  No Known Allergies      ROS:   All other ROS are negative.     PHYSICAL EXAM:   GEN: AAOx3, NAD  HEENT: EOMI, PERRLA, no injection or icterus, oral mucosa moist, no oral lesions. No lymphadenopathy. No facial rash  CVS: RRR, no murmurs rubs or  gallops. Equal 2+ distal pulses.   LUNGS: CTAB, no increased work of breathing  ABDOMEN:  soft NT/ND, +BS, no HSM  SKIN: No rashes or skin lesions. No nail findings  MSK:  Cervical spine: FROM  Hands: no synovitis in DIP, PIP and MCP, strong full fists  Wrist: FROM, no pain or swelling or warmth on palpation  Elbow: FROM, no pain or swelling or warmth on palpation  Shoulders: FROM, no pain or swelling or warmth on palpation  Hip: normal log roll, no lateral hip pain, ELVIN test negative b/l  Knees: FROM, no warmth or effusion present. No pain with ROM.   Ankles: FROM, no pain or swelling or warmth on palpation  Feet: no pain with MTP squeeze, no toe swelling or pain or warmth on palpation with FROM  Spine: no lumbar or sacral pain on palpation.  NEURO: Cranial nerves II-XII intact grossly. 5/5 strength throughout in both upper and lower extremities, sensation intact.  PSYCH: normal mood       LABS:     Reviewed    Imaging:     XR thoracic spine:  1. Mild chronic anterior wedging of lower thoracic vertebral bodies associated with kyphosis.   2. Multilevel degenerative disc disease/spondylosis most pronounced in the mid and lower thoracic spine.      XR Lumbar spine 2015:  No acute fracture. Progression of multilevel degenerative changes,   particularly degenerative disc disease at L2-L3.     Redemonstrated mild scoliosis.     There is facet arthropathy most pronounced on the left at L5-S1.     ASSESSMENT/PLAN:     Osteoporosis  - Bone density in January 2022 showed left femoral neck T score -2.2.  Thoracic x-ray is showing evidence of anterior wedging fracture which puts her in the category of osteoporosis no  - She was supposed to start Fosamax but never did  - Plan to repeat bone density, depending on results may consider Prolia versus Evenity.  She has a history of compression fractures in her thoracic spine  - She will continue calcium 600 mg daily and vitamin D 2000 units daily  - Blood work today     Chronic  lower back pain  - She is following with pain management.  She had an epidural steroid injection in May, it is doing better    Thoracic kyphosis  - This is likely due to her osteoporosis, x-ray showing mild chronic anterior wedging of the T11 and T12 vertebral bodies     L knee pain  - MRI in 2022 showed moderate patellofemoral OA and meniscus tear    Pt will f/u in 3-4 mos, she will be contacted regarding the bone density results and treatment options    Leah Chauhan MD  6/19/2024   2:29 PM

## 2024-06-20 ENCOUNTER — HOSPITAL ENCOUNTER (OUTPATIENT)
Dept: CT IMAGING | Age: 69
Discharge: HOME | End: 2024-06-20
Attending: INTERNAL MEDICINE

## 2024-06-20 DIAGNOSIS — Z13.6 SCREENING FOR CARDIOVASCULAR CONDITION: ICD-10-CM

## 2024-06-20 PROBLEM — M48.07 SPINAL STENOSIS OF LUMBOSACRAL REGION: Status: ACTIVE | Noted: 2024-06-20

## 2024-06-20 PROBLEM — M54.17 LUMBOSACRAL RADICULOPATHY: Status: ACTIVE | Noted: 2024-06-20

## 2024-06-20 PROBLEM — M54.42 ACUTE LEFT-SIDED LOW BACK PAIN WITH LEFT-SIDED SCIATICA: Status: ACTIVE | Noted: 2024-06-20

## 2024-06-20 PROBLEM — M41.9 SCOLIOSIS OF THORACOLUMBAR SPINE: Status: ACTIVE | Noted: 2024-06-20

## 2024-06-20 LAB
ALBUMIN SERPL ELPH-MCNC: 3.79 G/DL (ref 3.75–5.21)
ALBUMIN/GLOB SERPL: 1.35 {RATIO} (ref 1–2)
ALPHA1 GLOB SERPL ELPH-MCNC: 0.36 G/DL (ref 0.19–0.46)
ALPHA2 GLOB SERPL ELPH-MCNC: 0.89 G/DL (ref 0.48–1.05)
B-GLOBULIN SERPL ELPH-MCNC: 0.83 G/DL (ref 0.68–1.23)
GAMMA GLOB SERPL ELPH-MCNC: 0.73 G/DL (ref 0.62–1.7)
KAPPA LC FREE SER-MCNC: 1.94 MG/DL (ref 0.33–1.94)
KAPPA LC FREE/LAMBDA FREE SER NEPH: 2.08 {RATIO} (ref 0.26–1.65)
LAMBDA LC FREE SERPL-MCNC: 0.93 MG/DL (ref 0.57–2.63)
POCT GLUCOSE CHOLESTECH: 101 (ref 70–140)
POCT HDL: 68 (ref 55–75)
POCT LDL: 106 (ref 0–99)
POCT TOTAL CHOLESTEROL: 199 (ref 110–200)
POCT TRIGLYCERIDES: 127 (ref 1–149)
PROT SERPL-MCNC: 6.6 G/DL (ref 5.7–8.2)

## 2024-06-20 NOTE — PROGRESS NOTES
Date of Service 6/20/2024    FLORINA OJEDA  Date of Birth 7/7/1955    Patient Age: 68 year old    PCP: Jose Luis Carlson MD  [address] [place] [address]  [address]  [facility] [address]    Heart Scan Consult  Preliminary Heart Scan Score: 54.7    Previous Screening  Heart Scan Completed Previously: No                   Risk Factors  Personal Risk Factors  Alterable Risk Factors: Abnormal Cholesterol      Body Mass Index  BMI 36    Blood Pressure  /80 no med.  (Normal =< 120/80,  Elevated = 120-129/ >80,  High Stage1 130-139/80-89 , Stage2 >140/>90)    Lipid Profile  Patient was in fasting state: No    Cholesterol: 199, done on 6/20/2024.  HDL Cholesterol: 68, done on 6/20/2024.  LDL Cholesterol: 106, done on 6/20/2024.  TriGlycerides 127, done on 6/20/2024.  No med.    Cholesterol Goals  Value   Total  =< 200   HDL  = > 45 Men = > 55 Women   LDL   =< 100   Triglycerides  =< 150       Glucose and Hemoglobin A1C  Lab Results   Component Value Date    GLU 80 06/19/2024     (Normal Fasting Glucose < 100mg/dl )    Nurse Review  Risk factor information and results reviewed with Nurse: Yes    Recommended Follow Up:  Consult your physician regarding:: Final Heart Scan Report;Discuss potential for Incidental Finding      Recommendations for Change:  Nutrition Changes: Low Saturated Fat;Increase Fiber    Cholesterol Modification (goal of therapy depends upon your risk): Decrease LDL (Lousy/Bad) Ideal <100    Exercise: Enhance Current Program    Smoking Cessation: No Change Needed    Weight Management: Decrease Current Weight    Stress Management: Adopt Stress Management Techniques    Repeat Heart Scan: 3 Years if Calcium Score is > 0.0;Discuss with your Physician              [place] Recommended Resources:  Recommended Resources: Upcoming Classes, Medical Services and Health Library [email or web address]            Charisse BRUCE RN        Please Contact the Nurse Heart Line with any Questions or Concerns [phone].

## 2024-06-21 ENCOUNTER — HOSPITAL ENCOUNTER (OUTPATIENT)
Dept: BONE DENSITY | Facility: HOSPITAL | Age: 69
Discharge: HOME | End: 2024-06-21
Attending: INTERNAL MEDICINE

## 2024-06-21 DIAGNOSIS — M81.0 AGE-RELATED OSTEOPOROSIS WITHOUT CURRENT PATHOLOGICAL FRACTURE: ICD-10-CM

## 2024-06-21 PROCEDURE — 77080 DXA BONE DENSITY AXIAL: CPT | Performed by: INTERNAL MEDICINE

## 2024-06-25 ENCOUNTER — TELEPHONE (OUTPATIENT)
Dept: RHEUMATOLOGY | Facility: CLINIC | Age: 69
End: 2024-06-25

## 2024-06-25 NOTE — TELEPHONE ENCOUNTER
Called patient to discuss bone density results.  Her left total hip slightly declined.  She also has history of compression fractures.  I would like to get her approved for Evenity monthly for 12 months.  PA will be needed

## 2024-06-27 NOTE — TELEPHONE ENCOUNTER
Patient has Medicare Part B: no PA required. She has a supplement part G that will cover co-insurance for the cost of the medication.    [facility]     Component      Latest Ref Rng 6/19/2024   Glucose      70 - 99 mg/dL 80    Sodium      136 - 145 mmol/L 143    Potassium      3.5 - 5.1 mmol/L 4.0    Chloride      98 - 112 mmol/L 108    Carbon Dioxide, Total      21.0 - 32.0 mmol/L 26.0    ANION GAP      0 - 18 mmol/L 9    BUN      9 - 23 mg/dL 11    CREATININE      0.55 - 1.02 mg/dL 0.54 (L)    BUN/CREATININE RATIO      10.0 - 20.0  20.4 (H)    CALCIUM      8.7 - 10.4 mg/dL 10.2    CALCULATED OSMOLALITY      275 - 295 mOsm/kg 294    EGFR      >=60 mL/min/1.73m2 100    Patient Fasting for BMP? No       Legend:  (L) Low  (H) High

## 2024-07-03 ENCOUNTER — TELEPHONE (OUTPATIENT)
Dept: INTERNAL MEDICINE CLINIC | Facility: CLINIC | Age: 69
End: 2024-07-03

## 2024-07-03 DIAGNOSIS — I25.10 ATHEROSCLEROSIS OF NATIVE CORONARY ARTERY OF NATIVE HEART WITHOUT ANGINA PECTORIS: Primary | ICD-10-CM

## 2024-07-11 ENCOUNTER — OFFICE VISIT (OUTPATIENT)
Dept: NEUROLOGY | Facility: CLINIC | Age: 69
End: 2024-07-11
Payer: MEDICARE

## 2024-07-11 VITALS
BODY MASS INDEX: 37 KG/M2 | HEART RATE: 79 BPM | SYSTOLIC BLOOD PRESSURE: 122 MMHG | DIASTOLIC BLOOD PRESSURE: 76 MMHG | RESPIRATION RATE: 18 BRPM | WEIGHT: 211 LBS

## 2024-07-11 DIAGNOSIS — R41.3 MEMORY LOSS: Primary | ICD-10-CM

## 2024-07-11 PROCEDURE — 99204 OFFICE O/P NEW MOD 45 MIN: CPT | Performed by: OTHER

## 2024-07-11 NOTE — H&P
Neurology H&P    Marlee Rush Patient Status:  No patient class for patient encounter    1955 MRN [id number]   Location [facility], [address] [place] [address] Attending No att. providers found   Hosp Day # 0 PCP Jose Luis Carlson MD     Subjective:  Marlee Rush is a(n) 69 year old female with a PMH significant for HL, and who comes to the neurology clinic for short term memory loss. She states for instance that sometimes she brushes her teeth and then forgets that she brushed them. She states that her daughter prompted her and her  to come to the clinic today to discuss forgetfully. She states that she has a lot of stress in her life due to family stressors. She is driving and has no problems navigating and no unexplained accidents to the car. She works at her [employer] and has a high school education. She manages the household finances and no difficulty with this but it is stressful for her. She denies any hallucinations or delusions. She has no FH of dementia. She manages all of her ADLs without difficulty. She is not forgetting important sates or family members names etc. She states that she sleeps well, but has L knee pain and sometimes gets stressed out so does not sleep well during these times.     Current Medications:  Current Outpatient Medications   Medication Sig Dispense Refill    triamcinolone 0.1 % External Cream Apply topically 2 (two) times daily as needed. 30 g 0    CALCIUM OR Take by mouth.      Probiotic Product (PROBIOTIC ADVANCED) Oral Cap Take by mouth.      Multiple Vitamin (MULTI-VITAMIN DAILY) Oral Tab Take 1 tablet by mouth daily.      Omega-3 Fatty Acids (FISH OIL CONCENTRATE OR) Take 1 capsule by mouth daily.      Cholecalciferol (VITAMIN D3) 2000 units Oral Tab Take 1 capsule by mouth daily.      Vitamin C (VITAMIN C) 500 MG Oral Tab Take  by mouth.      cholecalciferol 25 MCG (1000 UT) Oral Cap Take by mouth.         Problem List:  Patient Active  Problem List   Diagnosis    Hyperlipidemia    Encounter for screening mammogram for breast cancer    Thyroid nodule    Vitamin D deficiency    Acute pain of left knee    Kyphosis of thoracic region    Osteopenia of multiple sites    Medicare annual wellness visit, subsequent    Screening for colon cancer    Age-related osteoporosis without current pathological fracture    Screening for heart disease    Foreign body finger    Lumbosacral radiculopathy    Spinal stenosis of lumbosacral region    Acute left-sided low back pain with left-sided sciatica    Scoliosis of thoracolumbar spine       PMHx:  Past Medical History:    High cholesterol    Obesity    Thyroid nodule    Visual impairment       PSHx:  Past Surgical History:   Procedure Laterality Date    Colonoscopy      egd and colonoscopy 2012    Tubal ligation Bilateral 1987       SocHx:  Social History     Socioeconomic History    Marital status:    Tobacco Use    Smoking status: Former     Passive exposure: Past    Smokeless tobacco: Never    Tobacco comments:     quit smoking 1991   Vaping Use    Vaping status: Never Used   Substance and Sexual Activity    Alcohol use: No     Alcohol/week: 0.0 standard drinks of alcohol    Drug use: No   Other Topics Concern    Caffeine Concern Yes     Comment: Coffee 1 cup daily; tea    Exercise No     Comment: physical therapy     Social Determinants of Health      [facility]       Family History:  Family History   Problem Relation Age of Onset    Diabetes Father     Heart Disorder Father     Heart Disorder Mother     Diabetes Sister            ROS:  10 point ROS completed and was negative, except for pertinent positive and negatives stated in subjective.    Objective/Physical Exam:    Vital Signs:  Blood pressure 122/76, pulse 79, resp. rate 18, weight 210 lb 15.7 oz (95.7 kg).    Gen: Awake and in no apparent distress  HEENT: moist mucus membranes  Neck: Supple  Cardiovascular: Regular  rate and rhythm, no murmur  Pulm: CTAB  GI: non-tender, normal bowel sounds  Skin: normal, dry  Extremities: No clubbing or cyanosis      Neurologic:   MENTAL STATUS:   MOCA: 7/11/24  Visuospatial/executive 5/5  Naming 3/3  Attention 6/6  Language 3/3  Abstraction 2/2  Delayed Recall 5/5  Orientation 6/6  Total: 30/30      CRANIAL NERVES II to XII: PERRLA, no ptosis or diplopia, EOM intact, facial sensation intact, strong eye closure, face is symmetric, no dysarthria, tongue midline,  no tongue fasciculations or atrophy, strong shoulder shrug.    MOTOR EXAMINATION: normal tone, no fasciculations, normal strength throughout in UEs and LEs except 4/5 HF strength    SENSORY EXAMINATION:  UE: intact to light touch, pinprick intact  LE: intact to light touch, pinprick intact    COORDINATION:  No dysmetria, or intention tremors     REFLEXES: 2+ at biceps, 2+ brachioradialis     GAIT: normal stance, mildly antalgic gait        Labs:       Imaging:  No CNS imaging to review    Assessment:  This is a 70 y/o female with reports of short term memory loss or poor concentration. Her exam is noted above. Her MOCA was 30/30 today which is normal and not consistent with any abnormal memory loss.  I did discuss getting an MRI of the brain for baseline evaluation however she declines.  This is reasonable as again she has a normal-appearing neurologic exam and no evidence of memory loss.  I did also offer to get B12 and thyroid levels however she declines.  Will watch this for now.  Can follow-up in 12 months or sooner if needed      Plan:  Short term memory loss  - MOCA 7/11/24:  30/30  - B12, TSH offered but declined  - MRI brain declined  - Will watch for now    Can RTC in 12 months if needed    Chuckie South, DO  Neurology

## 2024-07-11 NOTE — PROGRESS NOTES
Pt reports she began noticing memory changes two years ago.  Pt reports a decline over the last years.    Pt states she has noticed decline particularly in the last year with her short term memory. Gives example of brushing teeth and then forgetting if she has brushed teeth.  Declines issues with driving or leaving the stove on etc.    Pt's spouse reports that pt forgets where she puts items.  Spouse reports he believes some of memory loss is related to stress.

## 2024-07-11 NOTE — PATIENT INSTRUCTIONS
After your visit at the [facility] today,  please direct any follow up questions or medication needs to the staff in our [place] office so that your concerns may be promptly addressed.  We are available through [employer] or at the numbers below:    The phone number is:   [phone] option #1    The fax number is:  [phone]    Your pharmacy should also send any requests electronically to the [place] office.  Refill policies:    Allow 2-3 business days for refills; controlled substances may take longer.  Contact your pharmacy at least 5 days prior to running out of medication and have them send an electronic request or submit request through the “request refill” option in your [employer] account.  Refills are not addressed on weekends; covering physicians do not authorize routine medications on weekends.  No narcotics or controlled substances are refilled after noon on Fridays or by on call physicians.  By law, narcotics must be electronically prescribed.  A 30 day supply with no refills is the maximum allowed.  If your prescription is due for a refill, you may be due for a follow up appointment.  To best provide you care, patients receiving routine medications need to be seen at least once a year.  Patients receiving narcotic/controlled substance medications need to be seen at least once every 3 months.  In the event that your preferred pharmacy does not have the requested medication in stock (e.g. Backordered), it is your responsibility to find another pharmacy that has the requested medication available.  We will gladly send a new prescription to that pharmacy at your request.    Scheduling Tests:    If your physician has ordered radiology tests such as MRI or CT scans, please contact Central Scheduling at [phone] right away to schedule the test.  Once scheduled, the [facility] Centralized Referral Team will work with your insurance carrier to obtain pre-certification or prior authorization.   Depending on your insurance carrier, approval may take 3-10 days.  It is highly recommended patients assure they have received an authorization before having a test performed.  If test is done without insurance authorization, patient may be responsible for the entire amount billed.      Precertification and Prior Authorizations:  If your physician has recommended that you have a procedure or additional testing performed the [facility] Centralized Referral Team will contact your insurance carrier to obtain pre-certification or prior authorization.    You are strongly encouraged to contact your insurance carrier to verify that your procedure/test has been approved and is a COVERED benefit.  Although the [facility] Centralized Referral Team does its due diligence, the insurance carrier gives the disclaimer that \"Although the procedure is authorized, this does not guarantee payment.\"    Ultimately the patient is responsible for payment.   Thank you for your understanding in this matter.  Paperwork Completion:  If you require FMLA or disability paperwork for your recovery, please make sure to either drop it off or have it faxed to our office at [phone]. Be sure the form has your name and date of birth on it.  The form will be faxed to our Forms Department and they will complete it for you.  There is a 25$ fee for all forms that need to be filled out.  Please be aware there is a 10-14 day turnaround time.  You will need to sign a release of information (BRIGITTE) form if your paperwork does not come with one.  You may call the Forms Department with any questions at [phone].  Their fax number is [phone].

## 2024-07-15 PROBLEM — R41.3 MEMORY LOSS: Status: ACTIVE | Noted: 2024-07-15

## 2024-07-18 ENCOUNTER — TELEPHONE (OUTPATIENT)
Dept: ORTHOPEDICS CLINIC | Facility: CLINIC | Age: 69
End: 2024-07-18

## 2024-07-18 ENCOUNTER — OFFICE VISIT (OUTPATIENT)
Dept: ORTHOPEDICS CLINIC | Facility: CLINIC | Age: 69
End: 2024-07-18
Payer: MEDICARE

## 2024-07-18 VITALS — BODY MASS INDEX: 37.21 KG/M2 | WEIGHT: 210 LBS | HEIGHT: 63 IN

## 2024-07-18 DIAGNOSIS — M54.16 LUMBAR RADICULOPATHY: ICD-10-CM

## 2024-07-18 DIAGNOSIS — M17.12 PRIMARY OSTEOARTHRITIS OF LEFT KNEE: Primary | ICD-10-CM

## 2024-07-18 DIAGNOSIS — M48.061 SPINAL STENOSIS OF LUMBAR REGION WITHOUT NEUROGENIC CLAUDICATION: ICD-10-CM

## 2024-07-18 DIAGNOSIS — M25.552 GREATER TROCHANTERIC PAIN SYNDROME OF LEFT LOWER EXTREMITY: ICD-10-CM

## 2024-07-18 DIAGNOSIS — M43.06 LUMBAR SPONDYLOLYSIS: ICD-10-CM

## 2024-07-18 PROCEDURE — 99214 OFFICE O/P EST MOD 30 MIN: CPT | Performed by: FAMILY MEDICINE

## 2024-07-18 NOTE — PROGRESS NOTES
Sports Medicine Clinic Note    Subjective:    Chief Complaint: Left knee pain primarily, low back pain much better.    Interval History: This is a pleasant 69-year-old female who presents for a follow-up visit to discuss her ongoing left knee and low back pain. She reports significant improvement in her symptoms since the last visit following an epidural injection for her low back and a corticosteroid injection for her left knee. The patient notes that the left knee injection provided substantial relief, which is now mildly starting to wear off as she approaches 12 weeks post-injection. She inquires about the possibility of repeating the injection versus pursuing viscosupplementation. The patient has been informed that the choice between viscosupplementation and corticosteroid injections depends on individual response, and she is considering her options. Would like to discuss further which option is more appropriate for her.    Objective:    Left Knee Examination:    Inspection:  Antalgic gait.  Valgus alignment.  No appreciable muscle atrophy.  No warmth, erythema, or effusion.    Palpation:  Mild tenderness over the medial joint line and patella.  No tenderness over the lateral joint line, tibial tubercle, suprapatellar pouch, pes anserine bursa, popliteal fossa, or distal IT band.  Flexion from 0-115 degrees.  Extensor mechanism intact and without palpable defects.  No audible crepitus.    Neurovascular:  Sensation intact to light touch over saphenous, sural, superficial peroneal, deep peroneal, tibial, and femoral nerve distributions.  Motor strength is 5/5 in quadriceps, hamstrings, gastrocnemius, tibialis anterior, and extensor hallucis longus.  2+ dorsalis pedis and posterior tibial pulses, brisk capillary refill.    Diagnostic Tests:    Radiographs of the left knee from 8/14/23 were personally reviewed in the office and revealed no apparent fracture or dislocation. Tricompartmental joint space narrowing with  marginal osteophytes is noted, most pronounced in the lateral tibiofemoral compartment.    Assessment:    Primary osteoarthritis of the left knee.  Lumbar radiculopathy, improving post-LESI with pain management.    Plan:    Additional imaging/workup: Should have updated x-rays done for surveillance.    Therapy: The patient was provided with a physical therapy order focusing on quadriceps strengthening and mobility exercises but has deferred this for now.    Medications: Continue with Tylenol for pain management as needed.    Bracing/Casting: None at this time.    Procedures: Discussed the options for repeating the corticosteroid injection versus viscosupplementation. A prior authorization for viscosupplementation was submitted today. Discussed that response is very individualized.    Activity Recommendations: Activity is advised to be limited to tolerance.    Follow-Up: Tentative follow-up is scheduled in 2 weeks. At that time, a decision will be made regarding a repeat corticosteroid injection or proceeding with viscosupplementation if it is approved.      Gonzalo Guerra DO, CAQSM   Primary Care Sports Medicine    Department of Orthopaedic Surgery  [facility]    [address] [place] [id number]  [facility] [place] [id number]    t: [phone]  f: [phone]      [facility]

## 2024-07-19 ENCOUNTER — NURSE ONLY (OUTPATIENT)
Dept: RHEUMATOLOGY | Facility: CLINIC | Age: 69
End: 2024-07-19

## 2024-07-19 DIAGNOSIS — M80.00XA AGE-RELATED OSTEOPOROSIS WITH CURRENT PATHOLOGICAL FRACTURE, INITIAL ENCOUNTER: Primary | ICD-10-CM

## 2024-07-19 PROCEDURE — 96372 THER/PROPH/DIAG INJ SC/IM: CPT | Performed by: INTERNAL MEDICINE

## 2024-07-19 NOTE — PROGRESS NOTES
Name and  verified. Pt aware she is to receive her 1st Evenity injection. Injections given to bilateral lower abdomen. Patient tolerated well. Educational materials given and reviewed potential side effects. Pt ware to follow up in 4 weeks for next injection.

## 2024-07-22 ENCOUNTER — HOSPITAL ENCOUNTER (OUTPATIENT)
Dept: ULTRASOUND IMAGING | Facility: HOSPITAL | Age: 69
Discharge: HOME | End: 2024-07-22
Attending: INTERNAL MEDICINE
Payer: MEDICARE

## 2024-07-22 ENCOUNTER — HOSPITAL ENCOUNTER (OUTPATIENT)
Dept: MAMMOGRAPHY | Facility: HOSPITAL | Age: 69
Discharge: HOME | End: 2024-07-22
Attending: INTERNAL MEDICINE
Payer: MEDICARE

## 2024-07-22 ENCOUNTER — PATIENT MESSAGE (OUTPATIENT)
Dept: ORTHOPEDICS CLINIC | Facility: CLINIC | Age: 69
End: 2024-07-22

## 2024-07-22 DIAGNOSIS — M17.12 PRIMARY OSTEOARTHRITIS OF LEFT KNEE: Primary | ICD-10-CM

## 2024-07-22 DIAGNOSIS — R92.8 ABNORMAL MAMMOGRAM: ICD-10-CM

## 2024-07-22 PROCEDURE — 76642 ULTRASOUND BREAST LIMITED: CPT | Performed by: INTERNAL MEDICINE

## 2024-07-22 PROCEDURE — 77062 BREAST TOMOSYNTHESIS BI: CPT | Performed by: INTERNAL MEDICINE

## 2024-07-22 PROCEDURE — 77066 DX MAMMO INCL CAD BI: CPT | Performed by: INTERNAL MEDICINE

## 2024-07-22 NOTE — TELEPHONE ENCOUNTER
From: Marlee Rush  To: Gonzalo MOREJON  Sent: 7/22/2024 4:39 PM CDT  Subject: Visit follow up     From the follow up notes it’s saying to get updated X-rays / images but nothing was discussed - can you advise if I need to get updated X-rays and next steps ? Thank you!

## 2024-08-01 ENCOUNTER — OFFICE VISIT (OUTPATIENT)
Dept: ORTHOPEDICS CLINIC | Facility: CLINIC | Age: 69
End: 2024-08-01
Payer: MEDICARE

## 2024-08-01 ENCOUNTER — HOSPITAL ENCOUNTER (OUTPATIENT)
Dept: GENERAL RADIOLOGY | Age: 69
Discharge: HOME | End: 2024-08-01
Attending: FAMILY MEDICINE
Payer: MEDICARE

## 2024-08-01 VITALS — HEIGHT: 63 IN | BODY MASS INDEX: 37.21 KG/M2 | WEIGHT: 210 LBS

## 2024-08-01 DIAGNOSIS — M54.16 LUMBAR RADICULOPATHY: ICD-10-CM

## 2024-08-01 DIAGNOSIS — M17.12 PRIMARY OSTEOARTHRITIS OF LEFT KNEE: Primary | ICD-10-CM

## 2024-08-01 DIAGNOSIS — M17.12 PRIMARY OSTEOARTHRITIS OF LEFT KNEE: ICD-10-CM

## 2024-08-01 PROCEDURE — 99214 OFFICE O/P EST MOD 30 MIN: CPT | Performed by: FAMILY MEDICINE

## 2024-08-01 PROCEDURE — 20610 DRAIN/INJ JOINT/BURSA W/O US: CPT | Performed by: FAMILY MEDICINE

## 2024-08-01 PROCEDURE — 73564 X-RAY EXAM KNEE 4 OR MORE: CPT | Performed by: FAMILY MEDICINE

## 2024-08-01 RX ORDER — TRIAMCINOLONE ACETONIDE 40 MG/ML
40 INJECTION, SUSPENSION INTRA-ARTICULAR; INTRAMUSCULAR ONCE
Status: COMPLETED | OUTPATIENT
Start: 2024-08-01 | End: 2024-08-01

## 2024-08-01 RX ORDER — KETOROLAC TROMETHAMINE 30 MG/ML
30 INJECTION, SOLUTION INTRAMUSCULAR; INTRAVENOUS ONCE
Status: COMPLETED | OUTPATIENT
Start: 2024-08-01 | End: 2024-08-01

## 2024-08-01 RX ADMIN — KETOROLAC TROMETHAMINE 30 MG: 30 INJECTION, SOLUTION INTRAMUSCULAR; INTRAVENOUS at 12:37:00

## 2024-08-01 RX ADMIN — TRIAMCINOLONE ACETONIDE 40 MG: 40 INJECTION, SUSPENSION INTRA-ARTICULAR; INTRAMUSCULAR at 12:37:00

## 2024-08-01 NOTE — PROGRESS NOTES
Sports Medicine Clinic Note    Subjective:    Chief Complaint: Left knee pain.    Interval History: This is a pleasant 69-year-old female who returns for a follow-up visit regarding her left knee pain. She reports significant improvement after her last corticosteroid injection but notes that the relief has started to diminish as she approaches 12 weeks post-injection. The patient prefers to proceed with another corticosteroid injection today. She understands that viscosupplementation remains an option for future consideration and has been informed that it does not require prior authorization under her Medicare plan.    Objective:    Left Knee Examination:    Inspection:  - Antalgic gait.  - Valgus alignment.  - No appreciable muscle atrophy.  - No warmth, erythema, or effusion.    Palpation:  - Mild tenderness over the medial joint line and patella.  - No tenderness over the lateral joint line, tibial tubercle, suprapatellar pouch, pes anserine bursa, popliteal fossa, or distal IT band.  - Flexion from 0-115 degrees.  - Extensor mechanism intact and without palpable defects.  - No audible crepitus.    Neurovascular:  - Sensation intact to light touch over saphenous, sural, superficial peroneal, deep peroneal, tibial, and femoral nerve distributions.  - Motor strength is 5/5 in quadriceps, hamstrings, gastrocnemius, tibialis anterior, and extensor hallucis longus.  - 2+ dorsalis pedis and posterior tibial pulses, brisk capillary refill.    Diagnostic Tests:    Updated radiographs of the left knee were personally reviewed in the office and revealed no apparent fracture or dislocation. Tricompartmental joint space narrowing with marginal osteophytes is noted, most pronounced in the lateral tibiofemoral compartment. Slight progression from previous.    Assessment:    Primary osteoarthritis of the left knee.    Plan:    Therapy: Physical therapy order for quadriceps strengthening and mobility exercises remains deferred  at the patient's discretion.    Medications: Continue with Tylenol for pain management as needed.    Bracing/Casting: None at this time.    Procedures:  - Proceed with repeat corticosteroid injection to the left knee today, as previously discussed.  - Viscosupplementation remains a future option; no prior authorization needed under her Medicare plan.    Activity Recommendations: Activity is advised to be limited to tolerance.    Follow-Up: Tentative follow-up is scheduled in 4-6 weeks to assess response to today's injection and further discuss the need for viscosupplementation or other treatment options if necessary.    Procedure Note:    After discussion of the risks and benefits, the patient elected to proceed with a therapeutic injection into the left knee (tibiofemoral space). Confirmed that the patient does not have history of prior adverse reactions, active infections, or relevant allergies. There was no erythema or warmth, and the skin was clear.    The skin was sterilized with ChloraPrep. A 22 gauge needle was inserted via inferolateral approach. The site was injected with a mixture of 1 mL of kenalog 40 mg/mL, 1 mL of Toradol 30 mg/mL and 1 mL of 1% Lidocaine without Epinephrine. The injection was completed without complication, and a bandage was applied.    The patient tolerated the procedure well and was instructed to avoid strenuous activity for the next 24-48 hours and to use ice or Tylenol for pain as needed. The patient will call immediately with any signs of infection or allergic reaction.    Post-Injection Care: The patient tolerated the procedure well. An occlusive bandage was placed over the injection site. Post-injection care instructions provided to the patient. The patient was asked to avoid strenuous activity and continue to rest the area for 2-3 days before resuming regular activities. Patient advised that the area may be more painful for the first 1-2 days. They can use ice or Tylenol for  pain as needed.  Patient was instructed to watch for fever, increased swelling, or persistent pain. The patient will call immediately with any signs of infection or allergic reaction.    Complications: The patient tolerated the procedure well without any complications.      Gonzalo Guerra DO, KENZIE   Primary Care Sports Medicine    Department of Orthopaedic Surgery  [facility]    [address] [place] [address]  [address] [place] [address]    t: [phone]  f: [phone]      [facility]

## 2024-08-16 ENCOUNTER — NURSE ONLY (OUTPATIENT)
Dept: RHEUMATOLOGY | Facility: CLINIC | Age: 69
End: 2024-08-16

## 2024-08-16 DIAGNOSIS — M80.00XA AGE-RELATED OSTEOPOROSIS WITH CURRENT PATHOLOGICAL FRACTURE, INITIAL ENCOUNTER: Primary | ICD-10-CM

## 2024-08-16 PROCEDURE — 96372 THER/PROPH/DIAG INJ SC/IM: CPT | Performed by: INTERNAL MEDICINE

## 2024-08-16 NOTE — PROGRESS NOTES
Name and  verified. Pt aware she is to receive Evenity injection. Pt tolerated 1 st injection reported having some neck pain. Injections given and pt tolerated well. Pt aware to follow up in 4 weeks for next injections.

## 2024-08-21 ENCOUNTER — TELEPHONE (OUTPATIENT)
Dept: INTERNAL MEDICINE CLINIC | Facility: CLINIC | Age: 69
End: 2024-08-21

## 2024-08-22 ENCOUNTER — MED REC SCAN ONLY (OUTPATIENT)
Dept: INTERNAL MEDICINE CLINIC | Facility: CLINIC | Age: 69
End: 2024-08-22

## 2024-08-22 NOTE — TELEPHONE ENCOUNTER
Physical therapy plan of care dated 8/21/24 signed and faxed to [employer] at [phone], confirmation received.

## 2024-09-16 PROBLEM — M54.16 LUMBAR RADICULOPATHY: Status: ACTIVE | Noted: 2024-09-16

## 2024-09-16 PROBLEM — M48.061 SPINAL STENOSIS OF LUMBAR REGION WITHOUT NEUROGENIC CLAUDICATION: Status: ACTIVE | Noted: 2024-09-16

## 2024-09-17 ENCOUNTER — NURSE ONLY (OUTPATIENT)
Dept: RHEUMATOLOGY | Facility: CLINIC | Age: 69
End: 2024-09-17

## 2024-09-17 DIAGNOSIS — M81.0 AGE-RELATED OSTEOPOROSIS WITHOUT CURRENT PATHOLOGICAL FRACTURE: Primary | ICD-10-CM

## 2024-09-17 PROCEDURE — 96372 THER/PROPH/DIAG INJ SC/IM: CPT | Performed by: INTERNAL MEDICINE

## 2024-09-17 NOTE — PROGRESS NOTES
Name and  verified. Pt aware she was to receive injection of Evenity. Injections given in Bilateral Upper Arms,and pt tolerated well. Possible local side effects discussed with pt. Pt aware to follow up in 4 weeks for next injection.

## 2024-09-26 ENCOUNTER — OFFICE VISIT (OUTPATIENT)
Dept: INTERNAL MEDICINE CLINIC | Facility: CLINIC | Age: 69
End: 2024-09-26

## 2024-09-26 ENCOUNTER — NURSE TRIAGE (OUTPATIENT)
Dept: INTERNAL MEDICINE CLINIC | Facility: CLINIC | Age: 69
End: 2024-09-26

## 2024-09-26 VITALS
DIASTOLIC BLOOD PRESSURE: 80 MMHG | OXYGEN SATURATION: 96 % | BODY MASS INDEX: 38.25 KG/M2 | TEMPERATURE: 99 F | HEART RATE: 64 BPM | HEIGHT: 63 IN | SYSTOLIC BLOOD PRESSURE: 124 MMHG | WEIGHT: 215.88 LBS

## 2024-09-26 DIAGNOSIS — R59.0 LYMPHADENOPATHY, POSTERIOR CERVICAL: Primary | ICD-10-CM

## 2024-09-26 PROCEDURE — 99213 OFFICE O/P EST LOW 20 MIN: CPT | Performed by: INTERNAL MEDICINE

## 2024-09-26 NOTE — PROGRESS NOTES
Subjective:     Patient ID: Marlee Rush is a 69 year old female.    2mos ago felt a lump on the right side posterior neck. It went away and then came back again.  Then she started feeling another lump on left side of neck. Pt points to the suboccipital area that she felt these lump. No fevers noted. She also denies having sore throat, earpain, sinus symptoms.  No coughing noted.  Pt did complained of mild occipital headaches which she states comes from her neck.         History/Other:   Review of Systems   Constitutional: Negative.  Negative for appetite change and fever.   HENT: Negative.  Negative for congestion.    Eyes: Negative.    Respiratory: Negative.  Negative for cough, shortness of breath and wheezing.         No hemoptsysis   Cardiovascular: Negative.    Gastrointestinal: Negative.    Genitourinary: Negative.    Hematological:  Does not bruise/bleed easily.     Current Outpatient Medications   Medication Sig Dispense Refill    CALCIUM OR Take by mouth.      Probiotic Product (PROBIOTIC ADVANCED) Oral Cap Take by mouth.      Multiple Vitamin (MULTI-VITAMIN DAILY) Oral Tab Take 1 tablet by mouth daily.      Omega-3 Fatty Acids (FISH OIL CONCENTRATE OR) Take 1 capsule by mouth daily.      Cholecalciferol (VITAMIN D3) 2000 units Oral Tab Take 1 capsule by mouth daily.      Vitamin C (VITAMIN C) 500 MG Oral Tab Take  by mouth.      cholecalciferol 25 MCG (1000 UT) Oral Cap Take by mouth.      triamcinolone 0.1 % External Cream Apply topically 2 (two) times daily as needed. (Patient not taking: Reported on 9/26/2024) 30 g 0     Allergies:No Known Allergies    Past Medical History:    High cholesterol    Obesity    Thyroid nodule    Visual impairment      Past Surgical History:   Procedure Laterality Date    Colonoscopy      egd and colonoscopy 2012    Tubal ligation Bilateral 1987      Family History   Problem Relation Age of Onset    Diabetes Father     Heart Disorder Father     Heart Disorder Mother      Diabetes Sister       Social History:   Social History     Socioeconomic History    Marital status:    Tobacco Use    Smoking status: Former     Passive exposure: Past    Smokeless tobacco: Never    Tobacco comments:     quit smoking 1991   Vaping Use    Vaping status: Never Used   Substance and Sexual Activity    Alcohol use: No     Alcohol/week: 0.0 standard drinks of alcohol    Drug use: No   Other Topics Concern    Caffeine Concern Yes     Comment: Coffee 1 cup daily; tea    Exercise No     Comment: physical therapy     Social Determinants of Health      Received from [facility], On license of [facility]        Objective:   Physical Exam  Constitutional:       General: She is not in acute distress.     Appearance: She is well-developed. She is obese. She is not ill-appearing, toxic-appearing or diaphoretic.   HENT:      Head: Normocephalic and atraumatic.      Right Ear: Tympanic membrane, ear canal and external ear normal.      Left Ear: Tympanic membrane, ear canal and external ear normal.      Nose: Nose normal.      Mouth/Throat:      Pharynx: No oropharyngeal exudate.   Eyes:      General:         Right eye: No discharge.         Left eye: No discharge.      Conjunctiva/sclera: Conjunctivae normal.      Pupils: Pupils are equal, round, and reactive to light.   Neck:      Vascular: No JVD.   Cardiovascular:      Rate and Rhythm: Normal rate and regular rhythm.      Heart sounds: Normal heart sounds.   Pulmonary:      Effort: Pulmonary effort is normal. No respiratory distress.      Breath sounds: Normal breath sounds. No wheezing or rales.   Abdominal:      General: Bowel sounds are normal. There is no distension.      Palpations: Abdomen is soft. There is no mass.      Tenderness: There is no abdominal tenderness. There is no guarding or rebound.   Musculoskeletal:         General: No tenderness. Normal range of motion.      Cervical back: Normal range of motion and neck supple.    Lymphadenopathy:      Head:      Right side of head: No submental, submandibular, tonsillar, preauricular or posterior auricular adenopathy.      Left side of head: No submental, submandibular, tonsillar, preauricular or posterior auricular adenopathy.      Cervical: No cervical adenopathy.      Right cervical: No superficial, deep or posterior cervical adenopathy.     Left cervical: No superficial, deep or posterior cervical adenopathy.      Upper Body:      Right upper body: No supraclavicular, axillary, pectoral or epitrochlear adenopathy.      Left upper body: No supraclavicular, axillary, pectoral or epitrochlear adenopathy.   Skin:     General: Skin is warm and dry.      Coloration: Skin is not jaundiced or pale.      Findings: No rash.   Neurological:      Mental Status: She is alert and oriented to person, place, and time.         Assessment & Plan:   (R59.0) Lymphadenopathy, posterior cervical  (primary encounter diagnosis)  Plan: CT SOFT TISSUE OF NECK(CONTRAST ONLY)         (CPT=70491)        Pt states had felt suboccipital lump bilaterally which were also tender. I couldn't feel the swollen nodes but she was tender on palpation; I told her will do ct neck to check objectively for lymphadenopathy; order given to pt.   Call back if any worsening .        No orders of the defined types were placed in this encounter.      Meds This Visit:  Requested Prescriptions      No prescriptions requested or ordered in this encounter       Imaging & Referrals:  None

## 2024-09-26 NOTE — TELEPHONE ENCOUNTER
Per Dr. Carlson - if she can come in at 11:45am today, we can add her in    Patient contacted and made aware of Dr. Carlson's recommendation. I assisted in scheduling advised visit--> agreeable and scheduled. Patient verbalized understanding. No further questions or concerns at this time.    Future Appointments   Date Time Provider Department Center   9/26/2024 11:45 AM Jose Luis Carlson MD [facility]

## 2024-09-26 NOTE — TELEPHONE ENCOUNTER
Dr. Carlson - please advise same/next day visit, patient needs 20-30 minutes for commute and able to come in anytime you advise.  Please reply to pool: EM RN TRIAGE  Action Requested: Summary for Provider     []  Critical Lab, Recommendations Needed  [x] Need Additional Advice  []   FYI    []   Need Orders  [] Need Medications Sent to Pharmacy  []  Other     SUMMARY: Enlarged lymph nodes [2] at nape of neck that are approximately the size of a dime/quarter, tenderness to touch and painful when tilts head back. Headache present that is constant at sinus area and where lymph nodes are present. Same/Next day office visit advised--> agreeable, but no visits are available. [See below for more details]      Reason for call: Lymph Node and Headache  Onset: about 2 months ago enlarged lymph nodes; headache 9/24/24.       Reason for Disposition   Large nodes at multiple locations    Protocols used: Lymph Nodes - [id number]    Patient saw dermatologist at [facility] yesterday and 2 enlarged lymph nodes at the back of her neck/nape of neck. The nodes are about the size of a dime or a quarter, they are tender to touch, also when she tilts her head back also painful--> pain 5-6/10. Denies any redness. She also mentions she gets headaches more often; present now--> located in sinus area and at nape of the neck--> pain: 4/10 and constant. Denies any fever, sore throat or blurred vision. Denies any other symptoms. Home Care Advice discussed, per protocol. Refer to system/assessment yes/no answers. Same/Next day office visit advised--> agreeable, but no visits are available with PCP. I made her aware I will convey the above to Dr. Carlson for same/next day visit; she is able to come in any time, she needs 20-30 minutes for commute. Patient instructed any new or worsening symptoms [reviewed] seek immediate medical attention--> Immediate Care/Emergency Department. Patient verbalized understanding. No further questions  or concerns at this time.

## 2024-10-01 ENCOUNTER — MED REC SCAN ONLY (OUTPATIENT)
Dept: INTERNAL MEDICINE CLINIC | Facility: CLINIC | Age: 69
End: 2024-10-01

## 2024-10-03 ENCOUNTER — TELEPHONE (OUTPATIENT)
Dept: INTERNAL MEDICINE CLINIC | Facility: CLINIC | Age: 69
End: 2024-10-03

## 2024-10-03 NOTE — TELEPHONE ENCOUNTER
Received plan of care for date of service 10/02/24 from [facility], form has been placed on Dr. Carlson's desk for signature and review.

## 2024-10-05 ENCOUNTER — HOSPITAL ENCOUNTER (OUTPATIENT)
Dept: CT IMAGING | Age: 69
Discharge: HOME | End: 2024-10-05
Attending: INTERNAL MEDICINE
Payer: MEDICARE

## 2024-10-05 DIAGNOSIS — R59.0 LYMPHADENOPATHY, POSTERIOR CERVICAL: ICD-10-CM

## 2024-10-05 LAB
CREAT BLD-MCNC: 0.5 MG/DL
EGFRCR SERPLBLD CKD-EPI 2021: 101 ML/MIN/1.73M2 (ref 60–?)

## 2024-10-05 PROCEDURE — 70491 CT SOFT TISSUE NECK W/DYE: CPT | Performed by: INTERNAL MEDICINE

## 2024-10-05 PROCEDURE — 82565 ASSAY OF CREATININE: CPT

## 2024-10-08 ENCOUNTER — TELEPHONE (OUTPATIENT)
Dept: INTERNAL MEDICINE CLINIC | Facility: CLINIC | Age: 69
End: 2024-10-08

## 2024-10-08 DIAGNOSIS — M54.2 NECK PAIN: Primary | ICD-10-CM

## 2024-10-08 NOTE — TELEPHONE ENCOUNTER
Sorry it was just read by radiologist this morning;   Ct scan showed NO enlarged lymph nodes  on  her neck  Nor any neck masses seen.    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

## 2024-10-08 NOTE — TELEPHONE ENCOUNTER
Patient was called and inform of Dr. Carlson message below and she verbalized .    patient will like to do the Physical therapy for her neck muscle. Patient will like to go to  [facility] in [place]. I pended the referral for your review and approval.    RN please fax the Physical Therapy order once its signed  to [phone]. Thank you

## 2024-10-08 NOTE — TELEPHONE ENCOUNTER
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  The arthritis on her neck also contributing.   We can send her for physical therapy eval and tx and see if it will help her neck pain but no masses or lymph nodes  there as she had felt before. I think he is just feeling the muscles are sore due to above reason.

## 2024-10-08 NOTE — TELEPHONE ENCOUNTER
Patient calling, she had CT done 10/5/24 and was calling inquiring on results.   Patient dtr reviews mychart per patient and saw results are there.   No comments yet from PCP, advised results release right away when in and we ask that patients give a 3-4 business days for MD to receive, review and respond with comments or instruction from MD.    Patient states understanding and will wait for response on this.    I did ask patient if she does not hear back from us by later this week to call back and request update. She agrees to plan.

## 2024-10-08 NOTE — TELEPHONE ENCOUNTER
Patient was called and inform of Dr. Carlson message below and she verbalized understanding.    patient stated that this test was ordered because when she touches the back of her head above the hair line she has pain. Is the pain being caused by the arthritis? Should she be given medication to help with arthritis?  Please advise

## 2024-10-09 NOTE — TELEPHONE ENCOUNTER
Order faxed via Right fax to [phone]      Left message for patient that orders have been faxed to [facility]

## 2024-10-14 ENCOUNTER — TELEPHONE (OUTPATIENT)
Dept: INTERNAL MEDICINE CLINIC | Facility: CLINIC | Age: 69
End: 2024-10-14

## 2024-10-14 ENCOUNTER — TELEPHONE (OUTPATIENT)
Dept: RHEUMATOLOGY | Facility: CLINIC | Age: 69
End: 2024-10-14

## 2024-10-14 NOTE — TELEPHONE ENCOUNTER
Advised patient of Dr. Carlson's note. Patient verbalized understanding. Number also provided to medical records to get disk of previous mammograms/ultrasound for comparison when gets the ultrasound in [place].     Clinical staff: please mail copy of ultrasound order to patient's home address.

## 2024-10-14 NOTE — TELEPHONE ENCOUNTER
Would not want to delay ffup breast US; she may do it in [place] but will need to get a copy of her previous mammograms and US.

## 2024-10-14 NOTE — TELEPHONE ENCOUNTER
Dr Carlson,    Patient leaving to [place] end of December 2024 and returning in April 2025. Needs to repeat left breast ultrasound in 6 months due January 2025. Can she complete the ultrasound when she returns in April?    Pt inquiring if she can get her evenity monthly injections in [place]? Advised she should speak to the prescribing Dr Chauhan to get her advise.   Please reply to pool: EM RN TRIAGE

## 2024-10-15 ENCOUNTER — LAB ENCOUNTER (OUTPATIENT)
Dept: LAB | Age: 69
End: 2024-10-15
Attending: INTERNAL MEDICINE
Payer: MEDICARE

## 2024-10-15 ENCOUNTER — NURSE ONLY (OUTPATIENT)
Dept: RHEUMATOLOGY | Facility: CLINIC | Age: 69
End: 2024-10-15
Payer: MEDICARE

## 2024-10-15 DIAGNOSIS — M81.0 AGE-RELATED OSTEOPOROSIS WITHOUT CURRENT PATHOLOGICAL FRACTURE: Primary | ICD-10-CM

## 2024-10-15 DIAGNOSIS — M81.0 AGE-RELATED OSTEOPOROSIS WITHOUT CURRENT PATHOLOGICAL FRACTURE: ICD-10-CM

## 2024-10-15 LAB — CALCIUM BLD-MCNC: 9.5 MG/DL (ref 8.7–10.4)

## 2024-10-15 PROCEDURE — 96372 THER/PROPH/DIAG INJ SC/IM: CPT | Performed by: INTERNAL MEDICINE

## 2024-10-15 PROCEDURE — 82310 ASSAY OF CALCIUM: CPT

## 2024-10-15 PROCEDURE — 36415 COLL VENOUS BLD VENIPUNCTURE: CPT

## 2024-10-15 NOTE — PROGRESS NOTES
Name and  verified. Pt aware she was to receive injection of Evenity. Injections given in Bilateral Upper Arms, and pt tolerated well. Possible local side effects discussed with pt. Pt aware to follow up in  weeks for next injection and to have labs completed prior to next injection.

## 2024-11-05 ENCOUNTER — TELEPHONE (OUTPATIENT)
Dept: INTERNAL MEDICINE CLINIC | Facility: CLINIC | Age: 69
End: 2024-11-05

## 2024-11-05 NOTE — TELEPHONE ENCOUNTER
Received physical therapy plan of care from date date of service 11/1/24 to 12/12/24.  [facility] plan of care has been placed on Dr. Carlson's desk for review and signature.

## 2024-11-06 NOTE — TELEPHONE ENCOUNTER
Faxed Physical Therapy Plan of Care to [facility] to fax number [phone]. Confirmation fax has been received that fax went through successfully.

## 2024-11-12 ENCOUNTER — NURSE ONLY (OUTPATIENT)
Dept: RHEUMATOLOGY | Facility: CLINIC | Age: 69
End: 2024-11-12

## 2024-11-12 DIAGNOSIS — M81.0 AGE-RELATED OSTEOPOROSIS WITHOUT CURRENT PATHOLOGICAL FRACTURE: Primary | ICD-10-CM

## 2024-11-12 PROCEDURE — 96372 THER/PROPH/DIAG INJ SC/IM: CPT | Performed by: INTERNAL MEDICINE

## 2024-11-12 NOTE — PROGRESS NOTES
Name and  verified. Pt aware she was to receive injection of Evenity. Injections given in Bilateral Upper Arms and pt tolerated well. Possible local side effects discussed with pt. Pt aware to follow up in 4 weeks for next injection.

## 2024-11-25 ENCOUNTER — TELEPHONE (OUTPATIENT)
Dept: RHEUMATOLOGY | Facility: CLINIC | Age: 69
End: 2024-11-25

## 2024-11-25 DIAGNOSIS — M25.551 RIGHT HIP PAIN: Primary | ICD-10-CM

## 2024-11-25 NOTE — TELEPHONE ENCOUNTER
Having some right hip pain that comes and goes.  It is not constant.  She still able to walk.  She is on Evenity  Plan to order x-rays of the right hip and femur, I think that a fracture is less likely but we will get x-rays.  She is currently in [place], she will get done when she comes back

## 2024-11-25 NOTE — TELEPHONE ENCOUNTER
Patient, states that she is having hip pain. Per the patient she gets evenity vaccines and would like to know if this can cause the pain. Patient is requesting a call back from the nurse to discuss this.

## 2024-11-25 NOTE — TELEPHONE ENCOUNTER
Patient complaining of right upper hip pain 8/10 \"catching\" sharp/stabbing that happens occurs when moving from a sitting to standing position or vice versa mostly. Pain started about 1 week ago. Patient flew down to [place] about a week ago.    Patient wondering if her Evenity is causing this as she saw the commercial which stated it could be a side effect.  Last Evenity injection was 11/12/24.

## 2024-12-12 ENCOUNTER — NURSE ONLY (OUTPATIENT)
Dept: RHEUMATOLOGY | Facility: CLINIC | Age: 69
End: 2024-12-12

## 2024-12-12 DIAGNOSIS — M81.0 AGE-RELATED OSTEOPOROSIS WITHOUT CURRENT PATHOLOGICAL FRACTURE: Primary | ICD-10-CM

## 2024-12-12 PROCEDURE — 96372 THER/PROPH/DIAG INJ SC/IM: CPT | Performed by: INTERNAL MEDICINE

## 2024-12-12 NOTE — PROGRESS NOTES
Name and  verified. Pt aware she was to receive injection of Evenity. Injections given in Bilateral Upper Arms, and pt tolerated well. Possible local side effects discussed with pt. Pt aware to follow up in 4 weeks for next injection.

## 2024-12-17 ENCOUNTER — NURSE ONLY (OUTPATIENT)
Dept: INTERNAL MEDICINE CLINIC | Facility: CLINIC | Age: 69
End: 2024-12-17

## 2024-12-17 DIAGNOSIS — Z23 NEED FOR VACCINATION: Primary | ICD-10-CM

## 2024-12-17 PROCEDURE — G0008 ADMIN INFLUENZA VIRUS VAC: HCPCS | Performed by: INTERNAL MEDICINE

## 2024-12-17 PROCEDURE — 90662 IIV NO PRSV INCREASED AG IM: CPT | Performed by: INTERNAL MEDICINE

## 2024-12-23 ENCOUNTER — TELEMEDICINE (OUTPATIENT)
Dept: INTERNAL MEDICINE CLINIC | Facility: CLINIC | Age: 69
End: 2024-12-23

## 2024-12-23 ENCOUNTER — TELEPHONE (OUTPATIENT)
Dept: INTERNAL MEDICINE CLINIC | Facility: CLINIC | Age: 69
End: 2024-12-23

## 2024-12-23 DIAGNOSIS — R05.1 ACUTE COUGH: Primary | ICD-10-CM

## 2024-12-23 RX ORDER — AZITHROMYCIN 250 MG/1
TABLET, FILM COATED ORAL
Qty: 6 TABLET | Refills: 0 | Status: SHIPPED | OUTPATIENT
Start: 2024-12-23 | End: 2024-12-28

## 2024-12-23 RX ORDER — CODEINE PHOSPHATE AND GUAIFENESIN 10; 100 MG/5ML; MG/5ML
5 SOLUTION ORAL EVERY 6 HOURS PRN
Qty: 120 ML | Refills: 0 | Status: SHIPPED | OUTPATIENT
Start: 2024-12-23

## 2024-12-23 NOTE — TELEPHONE ENCOUNTER
Spoke to patient, full name and date of birth verified.  Patient spoke to Dr. Carlson, he asked her to take a covid test.   Patient reports her covid test was negative.     Patient with very harsh cough noted during call, requests prescription as she has not been able to sleep.

## 2024-12-23 NOTE — PROGRESS NOTES
Subjective:     Patient ID: Marlee Rush is a 69 year old female.    This is a telemedicine visit with live, interactive video and audio.      Patient understands and accepts financial responsibility for any deductible, co-insurance and/or co-pays associated with this service.     Cough  This is a new problem. The current episode started in the past 7 days (3 days). The problem has been unchanged. The cough is Productive of sputum and productive of purulent sputum. Associated symptoms include nasal congestion, postnasal drip, rhinorrhea and a sore throat. Pertinent negatives include no chest pain, fever, hemoptysis, myalgias, shortness of breath or wheezing. Nothing aggravates the symptoms. Risk factors: spouse also having same symptoms earlier but gettng better. She has tried OTC cough suppressant for the symptoms.       History/Other:   Review of Systems   Constitutional: Negative.  Negative for fever.   HENT:  Positive for congestion, postnasal drip, rhinorrhea and sore throat.    Eyes: Negative.    Respiratory:  Positive for cough. Negative for hemoptysis, shortness of breath and wheezing.    Cardiovascular:  Negative for chest pain.   Gastrointestinal:  Negative for diarrhea and vomiting.   Musculoskeletal:  Negative for arthralgias and myalgias.     Current Outpatient Medications   Medication Sig Dispense Refill    azithromycin (ZITHROMAX Z-CARMELINA) 250 MG Oral Tab Take 2 tablets (500 mg total) by mouth daily for 1 day, THEN 1 tablet (250 mg total) daily for 4 days. 6 tablet 0    guaiFENesin-codeine 100-10 MG/5ML Oral Solution Take 5 mL by mouth every 6 (six) hours as needed. 120 mL 0    triamcinolone 0.1 % External Cream Apply topically 2 (two) times daily as needed. (Patient not taking: Reported on 9/26/2024) 30 g 0    CALCIUM OR Take by mouth.      Probiotic Product (PROBIOTIC ADVANCED) Oral Cap Take by mouth.      Multiple Vitamin (MULTI-VITAMIN DAILY) Oral Tab Take 1 tablet by mouth daily.      Omega-3 Fatty  Acids (FISH OIL CONCENTRATE OR) Take 1 capsule by mouth daily.      Cholecalciferol (VITAMIN D3) 2000 units Oral Tab Take 1 capsule by mouth daily.      Vitamin C (VITAMIN C) 500 MG Oral Tab Take  by mouth.      cholecalciferol 25 MCG (1000 UT) Oral Cap Take by mouth.       Allergies:Allergies[1]    Past Medical History:    High cholesterol    Obesity    Thyroid nodule    Visual impairment      Past Surgical History:   Procedure Laterality Date    Colonoscopy      egd and colonoscopy 2012    Tubal ligation Bilateral 1987      Family History   Problem Relation Age of Onset    Diabetes Father     Heart Disorder Father     Heart Disorder Mother     Diabetes Sister       Social History:   Social History     Socioeconomic History    Marital status:    Tobacco Use    Smoking status: Former     Passive exposure: Past    Smokeless tobacco: Never    Tobacco comments:     quit smoking 1991; had smoked for  5 yrs only   Vaping Use    Vaping status: Never Used   Substance and Sexual Activity    Alcohol use: No     Alcohol/week: 0.0 standard drinks of alcohol    Drug use: No   Other Topics Concern    Caffeine Concern Yes     Comment: Coffee 1 cup daily; tea    Exercise No     Comment: physical therapy     Social Drivers of Health      [facility]        Objective:   Physical Exam  Constitutional:       General: She is not in acute distress.     Appearance: She is not ill-appearing, toxic-appearing or diaphoretic.      Comments: Patient did not sound to be in distress over the videophone, talking in long sentences, and answering questions appropriately      Pulmonary:      Effort: Pulmonary effort is normal. No respiratory distress.   Neurological:      Mental Status: She is alert.         Assessment & Plan:   (R05.1) Acute cough  (primary encounter diagnosis)  Plan: pt denies contact with covid ; she is vaccinated against covid. I told her to do home test for covid which she has  at home and call us back in few minutes for result.      Her covid test came back negative; told pt will then start her on cheratussin ac; I gave her zpak only to start if not better for tne next 3 to 4 days or symptoms worsens and also to call us back .    Please note that the following visit was completed using two-way, real-time interactive audio and/or video communication.  This has been done in good mady to provide continuity of care in the best interest of the provider-patient relationship, due to the ongoing public health crisis/national emergency and because of restrictions of visitation.  There are limitations of this visit as no physical exam could be performed.  Every conscious effort was taken to allow for sufficient and adequate time.  This billing was spent on reviewing labs, medications, radiology tests and decision making.  Appropriate medical decision-making and tests are ordered as detailed in the plan of care above        No orders of the defined types were placed in this encounter.      Meds This Visit:  Requested Prescriptions     Signed Prescriptions Disp Refills    azithromycin (ZITHROMAX Z-CARMELINA) 250 MG Oral Tab 6 tablet 0     Sig: Take 2 tablets (500 mg total) by mouth daily for 1 day, THEN 1 tablet (250 mg total) daily for 4 days.    guaiFENesin-codeine 100-10 MG/5ML Oral Solution 120 mL 0     Sig: Take 5 mL by mouth every 6 (six) hours as needed.       Imaging & Referrals:  None            [1] No Known Allergies

## 2025-01-03 ENCOUNTER — OFFICE VISIT (OUTPATIENT)
Dept: ORTHOPEDICS CLINIC | Facility: CLINIC | Age: 70
End: 2025-01-03

## 2025-01-03 VITALS
HEART RATE: 68 BPM | SYSTOLIC BLOOD PRESSURE: 114 MMHG | WEIGHT: 210 LBS | HEIGHT: 62.5 IN | DIASTOLIC BLOOD PRESSURE: 70 MMHG | BODY MASS INDEX: 37.68 KG/M2

## 2025-01-03 DIAGNOSIS — M17.12 PRIMARY OSTEOARTHRITIS OF LEFT KNEE: Primary | ICD-10-CM

## 2025-01-03 PROCEDURE — 20610 DRAIN/INJ JOINT/BURSA W/O US: CPT | Performed by: ORTHOPAEDIC SURGERY

## 2025-01-03 PROCEDURE — 99213 OFFICE O/P EST LOW 20 MIN: CPT | Performed by: ORTHOPAEDIC SURGERY

## 2025-01-03 RX ORDER — TRIAMCINOLONE ACETONIDE 40 MG/ML
40 INJECTION, SUSPENSION INTRA-ARTICULAR; INTRAMUSCULAR ONCE
Status: COMPLETED | OUTPATIENT
Start: 2025-01-03 | End: 2025-01-03

## 2025-01-03 RX ADMIN — TRIAMCINOLONE ACETONIDE 40 MG: 40 INJECTION, SUSPENSION INTRA-ARTICULAR; INTRAMUSCULAR at 14:30:00

## 2025-01-03 NOTE — PROGRESS NOTES
NURSING INTAKE COMMENTS:   Chief Complaint   Patient presents with    Knee Pain     F/u left knee pain 4/10 on ROM got cortisone injection on 08/01/2024 by Dr Guerra. Pain returned 2 months ago. Here for possible cortisone injection.       HPI: This 69 year old female presents today with complaints of left knee pain.  She has responded well to cortisone injections in past.  They typically last for about 4 months.    Past Medical History:    High cholesterol    Obesity    Thyroid nodule    Visual impairment     Past Surgical History:   Procedure Laterality Date    Colonoscopy      egd and colonoscopy 2012    Tubal ligation Bilateral 1987     Current Outpatient Medications   Medication Sig Dispense Refill    guaiFENesin-codeine 100-10 MG/5ML Oral Solution Take 5 mL by mouth every 6 (six) hours as needed. 120 mL 0    triamcinolone 0.1 % External Cream Apply topically 2 (two) times daily as needed. 30 g 0    CALCIUM OR Take by mouth.      Probiotic Product (PROBIOTIC ADVANCED) Oral Cap Take by mouth.      Multiple Vitamin (MULTI-VITAMIN DAILY) Oral Tab Take 1 tablet by mouth daily.      Omega-3 Fatty Acids (FISH OIL CONCENTRATE OR) Take 1 capsule by mouth daily.      Cholecalciferol (VITAMIN D3) 2000 units Oral Tab Take 1 capsule by mouth daily.      Vitamin C (VITAMIN C) 500 MG Oral Tab Take  by mouth.      cholecalciferol 25 MCG (1000 UT) Oral Cap Take by mouth.       Allergies[1]  Family History   Problem Relation Age of Onset    Diabetes Father     Heart Disorder Father     Heart Disorder Mother     Diabetes Sister        Social History     Occupational History    Not on file   Tobacco Use    Smoking status: Former     Passive exposure: Past    Smokeless tobacco: Never    Tobacco comments:     quit smoking 1991; had smoked for  5 yrs only   Vaping Use    Vaping status: Never Used   Substance and Sexual Activity    Alcohol use: No     Alcohol/week: 0.0 standard drinks of alcohol    Drug use: No    Sexual activity:  Not on file        Review of Systems:  GENERAL: feels generally well, no significant weight loss or weight gain  SKIN: no ulcerated or worrisome skin lesions  EYES:denies blurred vision or double vision  HEENT: denies new nasal congestion, sinus pain or ST  LUNGS: denies shortness of breath  CARDIOVASCULAR: denies chest pain  GI: no hematemesis, no worsening heartburn, no diarrhea  : no dysuria, no blood in urine, no difficulty urinating, no incontinence  MUSCULOSKELETAL: no other musculoskeletal complaints other than in HPI  NEURO: no numbness or tingling, no weakness or balance disorder  PSYCHE: no depression or anxiety  HEMATOLOGIC: no hx of blood dyscrasia  ENDOCRINE: no thyroid or diabetes issues  ALL/ASTHMA: no new hx of severe allergy or asthma    Physical Examination:    /70 (BP Location: Left arm, Patient Position: Sitting, Cuff Size: adult)   Pulse 68   Ht 5' 2.5\" (1.588 m)   Wt 210 lb (95.3 kg)   BMI 37.80 kg/m²   Constitutional: appears well hydrated, alert and responsive, no acute distress noted  Extremities: Pain with range of motion left knee.  No effusion.      Imaging: No results found.     Lab Results   Component Value Date    WBC 7.4 04/11/2023    HGB 13.1 04/11/2023    .0 04/11/2023      Lab Results   Component Value Date    GLU 80 06/19/2024    BUN 11 06/19/2024    CREATSERUM 0.54 (L) 06/19/2024    GFRNAA 106 04/19/2021    GFRAA 123 04/19/2021        Assessment and Plan:  Diagnoses and all orders for this visit:    Primary osteoarthritis of left knee  -     arthrocentesis major joint  -     triamcinolone acetonide (Kenalog-40) 40 MG/ML injection 40 mg        Assessment: Left knee osteoarthritis.    Procedure: The risks and benefits of a cortisone injection were discussed with the patient.  An informational sheet was also provided and the patient had ample time to review it.  Under sterile preparation, the left knee was injected with 40 mg of Kenalog and 4 cc 0.5% marcaine.   The patient tolerated the procedure well.      Plan: We talked about Hyaluronate versus cortisone injections.  She will follow-up as needed.    The above note was creating using Dragon speech recognition technology. Please excuse any typos.    TIFFANIE GRANT MD       [1] No Known Allergies

## 2025-01-03 NOTE — PROGRESS NOTES
Per verbal order from Dr. Bloom, draw up and 4ml of 0.5% Marcaine and 1ml of Kenalog 40 for injection into left kneeRubicelia MICHEAL ROBERTS  Patient provided education handout for cortisone injection.

## 2025-01-19 ENCOUNTER — TELEPHONE (OUTPATIENT)
Dept: INTERNAL MEDICINE CLINIC | Facility: CLINIC | Age: 70
End: 2025-01-19

## 2025-01-19 DIAGNOSIS — R92.8 ABNORMAL MAMMOGRAM: Primary | ICD-10-CM

## 2025-01-20 NOTE — TELEPHONE ENCOUNTER
Contacted patient. Name and  verified. Informed patient she is due for left breast US. Patient states she is in [place] and will find a place to have her ultrasound done there.

## 2025-01-29 NOTE — TELEPHONE ENCOUNTER
Verified name and .    Patient is requesting that left breast ultrasound order be faxed to:  [facility]   [phone]    Faxed via [facility].

## 2025-01-30 NOTE — TELEPHONE ENCOUNTER
Faxed orders for Diagnostic mammogram with Ultrasound of the left breast to [facility] to fax num er [phone]. Confirmation has been received that fax went through successfully.

## 2025-01-30 NOTE — TELEPHONE ENCOUNTER
Received a call from Patient who is in [place] advising that the [facility] (see note below) will not do just an ultrasound. They need an order for Diagnostic mammogram with Ultrasound of the left breast.    Dr. Carlson: please review the pended order and have office staff fax to  [phone] [facility] Thanks

## 2025-02-03 NOTE — TELEPHONE ENCOUNTER
Patient called wanting to know if the orders were faxed--> yes, see below. Patient was made aware the orders were faxed. Patient verbalized understanding. No further questions or concerns at this time.

## 2025-03-07 ENCOUNTER — NURSE TRIAGE (OUTPATIENT)
Dept: INTERNAL MEDICINE CLINIC | Facility: CLINIC | Age: 70
End: 2025-03-07

## 2025-03-07 NOTE — TELEPHONE ENCOUNTER
Patient contacted and informed of response as stated below by Dr. Carlson. Patient verbalized understanding. States she has gone to local [facility] in the past. Does not have a local doctor in [place] that she is established with. States may continue with accupuncture treatment for now. Once she knows her return date, she will call to schedule appointment with Dr. Carlson for evaluation and follow up. No further questions at this time.

## 2025-03-07 NOTE — TELEPHONE ENCOUNTER
I had not seen pt for this problem now for a year. She really needs to see local clinic/doctor for now so she can be evaluated/examined  and then they can order PT eval for pt if appropriate or not.

## 2025-03-07 NOTE — TELEPHONE ENCOUNTER
Action Requested: Summary for Provider     []  Critical Lab, Recommendations Needed  [] Need Additional Advice  []   FYI    [x]   Need Orders  [] Need Medications Sent to Pharmacy  []  Other     SUMMARY:  patient is currently out of state in [place]. She will like for you to order her physical Therapy for her lower back pain. Patient stated that if she is sitting down she has no pain but if she walks the pain is a 7/10. The pain sometimes travels down her leg or it just stays in her lower back. Patient will not be back until after Easter. Per patient she has had this in the past.    Patient was inform if the pain becomes severe she should seek medical attention in [place]. Patient verbalized understanding.    Physical Therapy order needs to be faxed to FAX  to [phone]    Reason for call: Back Pain  Onset: Chronic      Reason for Disposition   Back pain is a chronic symptom (recurrent or ongoing AND lasting > 4 weeks)    Protocols used: Back Pain-A-[place]

## 2025-04-22 ENCOUNTER — OFFICE VISIT (OUTPATIENT)
Dept: INTERNAL MEDICINE CLINIC | Facility: CLINIC | Age: 70
End: 2025-04-22

## 2025-04-22 ENCOUNTER — TELEPHONE (OUTPATIENT)
Dept: RHEUMATOLOGY | Facility: CLINIC | Age: 70
End: 2025-04-22

## 2025-04-22 VITALS
HEART RATE: 67 BPM | WEIGHT: 210.06 LBS | DIASTOLIC BLOOD PRESSURE: 76 MMHG | SYSTOLIC BLOOD PRESSURE: 128 MMHG | BODY MASS INDEX: 38 KG/M2

## 2025-04-22 DIAGNOSIS — M54.16 LUMBAR RADICULOPATHY, CHRONIC: ICD-10-CM

## 2025-04-22 DIAGNOSIS — F41.8 SITUATIONAL ANXIETY: ICD-10-CM

## 2025-04-22 DIAGNOSIS — M48.061 SPINAL STENOSIS OF LUMBAR REGION, UNSPECIFIED WHETHER NEUROGENIC CLAUDICATION PRESENT: Primary | ICD-10-CM

## 2025-04-22 PROCEDURE — 99213 OFFICE O/P EST LOW 20 MIN: CPT | Performed by: INTERNAL MEDICINE

## 2025-04-22 NOTE — PROGRESS NOTES
Subjective:     Patient ID: Marlee Rush is a 69 year old female.    Low Back Pain  This is a chronic problem. The current episode started more than 1 month ago. The problem occurs intermittently. The problem has been waxing and waning since onset. The pain is present in the lumbar spine. The quality of the pain is described as aching. The pain radiates to the left knee. The pain is at a severity of 6/10. The pain is moderate. The pain is Worse during the day. The symptoms are aggravated by standing (walking). Pertinent negatives include no bladder incontinence, bowel incontinence, dysuria, fever, numbness, paresis, paresthesias, perianal numbness, weakness or weight loss. Risk factors include obesity. She has tried heat, bed rest, analgesics and NSAIDs (accupunture) for the symptoms. The treatment provided mild relief.       History/Other:   Review of Systems   Constitutional: Negative.  Negative for fever and weight loss.   Respiratory: Negative.     Cardiovascular: Negative.    Gastrointestinal: Negative.  Negative for bowel incontinence.   Genitourinary: Negative.  Negative for bladder incontinence, dysuria and hematuria.   Musculoskeletal:  Positive for back pain.   Neurological:  Negative for weakness, numbness and paresthesias.     Current Medications[1]  Allergies:Allergies[2]    Past Medical History[3]   Past Surgical History[4]   Family History[5]   Social History: Short Social Hx on File[6]     Objective:   Physical Exam  Constitutional:       General: She is not in acute distress.     Appearance: She is obese. She is not ill-appearing, toxic-appearing or diaphoretic.   HENT:      Right Ear: External ear normal.      Left Ear: External ear normal.   Cardiovascular:      Rate and Rhythm: Normal rate and regular rhythm.      Heart sounds: Normal heart sounds. No murmur heard.     No gallop.   Pulmonary:      Effort: Pulmonary effort is normal. No respiratory distress.      Breath sounds: Normal breath  sounds. No wheezing or rales.   Abdominal:      General: Bowel sounds are normal. There is no distension.      Palpations: Abdomen is soft. There is no mass.      Tenderness: There is no abdominal tenderness. There is no guarding or rebound.      Hernia: No hernia is present.   Musculoskeletal:      Cervical back: No rigidity or tenderness.      Lumbar back: No swelling, edema, deformity, spasms, tenderness or bony tenderness. Normal range of motion. Negative right straight leg raise test and negative left straight leg raise test.      Right hip: No deformity, tenderness or bony tenderness. Normal range of motion. Normal strength.      Left hip: No deformity, tenderness or bony tenderness. Normal range of motion. Normal strength.      Right lower leg: No edema.      Left lower leg: No edema.   Lymphadenopathy:      Cervical: No cervical adenopathy.   Skin:     Coloration: Skin is not jaundiced or pale.      Findings: No rash.   Neurological:      Mental Status: She is alert.      Sensory: Sensation is intact. No sensory deficit.      Motor: Motor function is intact. No weakness, tremor or abnormal muscle tone.      Deep Tendon Reflexes:      Reflex Scores:       Patellar reflexes are 2+ on the right side and 2+ on the left side.       Achilles reflexes are 2+ on the right side and 2+ on the left side.     Comments: Ankle clonus negative.          Assessment & Plan:   (M48.061) Spinal stenosis of lumbar region, unspecified whether neurogenic claudication present  (primary encounter diagnosis)  Plan: Pain Management Referral - In Network        Pt with known hx of lumbar spinal stenosis and spondylosis before, neuro exam today no neurodeficit noted. I told her to see pain specialist, may benefit again from CHARLEEN which she got in the past with good relief of her back pain. Referral given. Call back if persist/wosens.     (M54.16) Lumbar radiculopathy, chronic  Plan: Pain Management Referral - In Network        See  above.    (F41.8) Situational anxiety  Plan: [facility] Referral - In Network        Pt feeling stressed with family situation; pt advised to see counselor for counseling. Pt agreed and [place] [facility]       No orders of the defined types were placed in this encounter.      Meds This Visit:  Requested Prescriptions      No prescriptions requested or ordered in this encounter       Imaging & Referrals:  None            [1]   Current Outpatient Medications   Medication Sig Dispense Refill    guaiFENesin-codeine 100-10 MG/5ML Oral Solution Take 5 mL by mouth every 6 (six) hours as needed. 120 mL 0    triamcinolone 0.1 % External Cream Apply topically 2 (two) times daily as needed. 30 g 0    CALCIUM OR Take by mouth.      Probiotic Product (PROBIOTIC ADVANCED) Oral Cap Take by mouth.      Omega-3 Fatty Acids (FISH OIL CONCENTRATE OR) Take 1 capsule by mouth daily.      Cholecalciferol (VITAMIN D3) 2000 units Oral Tab Take 1 capsule by mouth daily.      Vitamin C (VITAMIN C) 500 MG Oral Tab Take  by mouth.      cholecalciferol 25 MCG (1000 UT) Oral Cap Take by mouth.      Multiple Vitamin (MULTI-VITAMIN DAILY) Oral Tab Take 1 tablet by mouth daily. (Patient not taking: Reported on 4/22/2025)     [2] No Known Allergies  [3]   Past Medical History:   High cholesterol    Obesity    Thyroid nodule    Visual impairment   [4]   Past Surgical History:  Procedure Laterality Date    Colonoscopy      egd and colonoscopy 2012    Tubal ligation Bilateral 1987   [5]   Family History  Problem Relation Age of Onset    Diabetes Father     Heart Disorder Father     Heart Disorder Mother     Diabetes Sister    [6]   Social History  Socioeconomic History    Marital status:    Tobacco Use    Smoking status: Former     Passive exposure: Past    Smokeless tobacco: Never    Tobacco comments:     quit smoking 1991; had smoked for  5 yrs only   Vaping Use    Vaping status: Never Used   Substance and Sexual Activity    Alcohol use: No      Alcohol/week: 0.0 standard drinks of alcohol    Drug use: No   Other Topics Concern    Caffeine Concern Yes     Comment: Coffee 1 cup daily; tea    Exercise No     Comment: physical therapy     Social Drivers of Health      Received from [facility]

## 2025-04-22 NOTE — TELEPHONE ENCOUNTER
Patient called and said she needs an evenity injection she gets these done monthly. Patient said she got them in [place] in February, March and April

## 2025-04-30 ENCOUNTER — OFFICE VISIT (OUTPATIENT)
Dept: PAIN CLINIC | Facility: HOSPITAL | Age: 70
End: 2025-04-30
Attending: ANESTHESIOLOGY
Payer: MEDICARE

## 2025-04-30 VITALS
OXYGEN SATURATION: 94 % | WEIGHT: 208 LBS | BODY MASS INDEX: 37 KG/M2 | DIASTOLIC BLOOD PRESSURE: 62 MMHG | HEART RATE: 66 BPM | SYSTOLIC BLOOD PRESSURE: 121 MMHG

## 2025-04-30 DIAGNOSIS — M54.42 CHRONIC BILATERAL LOW BACK PAIN WITH BILATERAL SCIATICA: ICD-10-CM

## 2025-04-30 DIAGNOSIS — M54.41 CHRONIC BILATERAL LOW BACK PAIN WITH BILATERAL SCIATICA: ICD-10-CM

## 2025-04-30 DIAGNOSIS — M48.062 SPINAL STENOSIS OF LUMBAR REGION WITH NEUROGENIC CLAUDICATION: Primary | ICD-10-CM

## 2025-04-30 DIAGNOSIS — M47.816 LUMBAR SPONDYLOSIS: ICD-10-CM

## 2025-04-30 DIAGNOSIS — G89.29 CHRONIC BILATERAL LOW BACK PAIN WITH BILATERAL SCIATICA: ICD-10-CM

## 2025-04-30 PROCEDURE — 99214 OFFICE O/P EST MOD 30 MIN: CPT | Performed by: ANESTHESIOLOGY

## 2025-04-30 NOTE — PATIENT INSTRUCTIONS
Refill policies:    Allow 2-3 business days for refills; controlled substances may take longer.  Contact your pharmacy at least 5 days prior to running out of medication and have them send an electronic request or submit request through the “request refill” option in your [employer] account.  Refills are not addressed on weekends; covering physicians do not authorize routine medications on weekends.  No narcotics or controlled substances are refilled after noon on Fridays or by on call physicians.  By law, narcotics must be electronically prescribed.  A 30 day supply with no refills is the maximum allowed.  If your prescription is due for a refill, you may be due for a follow up appointment.  To best provide you care, patients receiving routine medications need to be seen at least once a year.  Patients receiving narcotic/controlled substance medications need to be seen at least once every 3 months.  In the event that your preferred pharmacy does not have the requested medication in stock (e.g. Backordered), it is your responsibility to find another pharmacy that has the requested medication available.  We will gladly send a new prescription to that pharmacy at your request.    Scheduling Tests:    If your physician has ordered radiology tests such as MRI or CT scans, please contact Central Scheduling at [phone] right away to schedule the test.  Once scheduled, the [facility] Centralized Referral Team will work with your insurance carrier to obtain pre-certification or prior authorization.  Depending on your insurance carrier, approval may take 3-10 days.  It is highly recommended patients assure they have received an authorization before having a test performed.  If test is done without insurance authorization, patient may be responsible for the entire amount billed.      Precertification and Prior Authorizations:  If your physician has recommended that you have a procedure or additional testing performed the [facility]  Centralized Referral Team will contact your insurance carrier to obtain pre-certification or prior authorization.    You are strongly encouraged to contact your insurance carrier to verify that your procedure/test has been approved and is a COVERED benefit.  Although the [facility] Centralized Referral Team does its due diligence, the insurance carrier gives the disclaimer that \"Although the procedure is authorized, this does not guarantee payment.\"    Ultimately the patient is responsible for payment.   Thank you for your understanding in this matter.  Paperwork Completion:  If you require FMLA or disability paperwork for your recovery, please make sure to either drop it off or have it faxed to our office at [phone]. Be sure the form has your name and date of birth on it.  The form will be faxed to our Forms Department and they will complete it for you.  There is a 25$ fee for all forms that need to be filled out.  Please be aware there is a 10-14 day turnaround time.  You will need to sign a release of information (BRIGITTE) form if your paperwork does not come with one.  You may call the Forms Department with any questions at [phone].  Their fax number is [phone].

## 2025-04-30 NOTE — CHRONIC PAIN
[facility]  Follow UP Visit Note         History of Present Illness:    Marlee Rush is a 69 year old female, originally referred to the pain clinic by Dr. Mohr ref. provider found,  for low back pain with radiation into the left lower extremity.   The patient returned today for follow up visit after L4-5 LESI done on 05/22/2024.   The patient reports successful resolution of the pain from the injection.   She reports 90-95% pain relief which is still ongoing. Today her pain is 0/10.  Denies any other issues. She would like to resume PT which was stopped due to the pain.         S/p LESI at L4-5 done on 05/22/2024 which relieved 90-95% helped   Soreness in both legs when walking   \"Posture off\" unable to stand or walk upright; prefers leaning forward when walking   Pain in the left knee; Dr. Bloom gives injections, last one in Jan 2025 but did not help   Never had gel injection   No n/t in feet or legs   No bladder/bowel incontinence   Takes advil prn sometimes none sometimes 2tabs in  AM and 2 at night; helps some   Acupuncture helped         Blood Thinning Medications:  None    Current Medications:  Current Outpatient Medications   Medication Sig Dispense Refill    CALCIUM OR Take by mouth.      Probiotic Product (PROBIOTIC ADVANCED) Oral Cap Take by mouth.      Omega-3 Fatty Acids (FISH OIL CONCENTRATE OR) Take 1 capsule by mouth in the morning.      Cholecalciferol (VITAMIN D3) 2000 units Oral Tab Take 1 capsule by mouth in the morning.      Vitamin C (VITAMIN C) 500 MG Oral Tab Take by mouth.      Multiple Vitamin (MULTI-VITAMIN DAILY) Oral Tab Take 1 tablet by mouth daily. (Patient not taking: No sig reported)          Allergies:  No Known Allergies     Review of Systems:  Bowel/Bladder Incontinence:  As above  Coughing/Sneezing/Straining does not exacerbate the pain.  Numbness/tingling:  As above  Weakness:  As above  Weight Loss:  Negative  Fever:  Negative  Cardiovascular:  No  current chest pain or palpitations  Respiratory:  No current shortness of breath  Gastrointestinal:  No active ulcer  Genitourinary:  Negative  Integumentary:  Negative  Psychiatric:  Negative  Hematologic:  No active bleeding  Lymphatic:  No current lymphedema  Allergic/Immunologic:  Negative  Musculoskeletal:  As above  Neurological:  As above  Denies chest pain, shortness of breath.    Medical History:  Patient Active Problem List   Diagnosis    Hyperlipidemia    Encounter for screening mammogram for breast cancer    Thyroid nodule    Vitamin D deficiency    Acute pain of left knee    Kyphosis of thoracic region    Osteopenia of multiple sites    Medicare annual wellness visit, subsequent    Screening for colon cancer    Age-related osteoporosis without current pathological fracture    Screening for heart disease    Foreign body finger    Lumbosacral radiculopathy    Spinal stenosis of lumbosacral region    Acute left-sided low back pain with left-sided sciatica    Scoliosis of thoracolumbar spine    Memory loss    Lumbar radiculopathy    Spinal stenosis of lumbar region without neurogenic claudication        Past Medical History:    High cholesterol    Obesity    Thyroid nodule    Visual impairment       Surgical History:  Past Surgical History:   Procedure Laterality Date    Colonoscopy      egd and colonoscopy 2012    Tubal ligation Bilateral 1987        Family History:   Family History   Problem Relation Age of Onset    Diabetes Father     Heart Disorder Father     Heart Disorder Mother     Diabetes Sister         Social History:  Social History     Socioeconomic History    Marital status:      Spouse name: Not on file    Number of children: Not on file    Years of education: Not on file    Highest education level: Not on file   Occupational History    Not on file   Tobacco Use    Smoking status: Former     Passive exposure: Past    Smokeless tobacco: Never    Tobacco comments:     quit smoking 1991;  had smoked for  5 yrs only   Vaping Use    Vaping status: Never Used   Substance and Sexual Activity    Alcohol use: No     Alcohol/week: 0.0 standard drinks of alcohol    Drug use: No    Sexual activity: Not on file   Other Topics Concern     Service Not Asked    Blood Transfusions Not Asked    Caffeine Concern Yes     Comment: Coffee 1 cup daily; tea    Occupational Exposure Not Asked    Hobby Hazards Not Asked    Sleep Concern Not Asked    Stress Concern Not Asked    Weight Concern Not Asked    Special Diet Not Asked    Back Care Not Asked    Exercise No     Comment: physical therapy    Bike Helmet Not Asked    Seat Belt Not Asked    Self-Exams Not Asked   Social History Narrative    Not on file     Social Drivers of Health     Food Insecurity: Not on file   Transportation Needs: Not on file   Housing Stability: At Risk (8/27/2023)    Received from [facility]     Living Situation: Not on file     Housing Problems: Not on file        Physical Examination:  Vitals:    04/30/25 1209   BP: 121/62   Pulse: 66        General:  Alert and oriented x3  Affect:  NAD  Head:  Normocephalic, atraumatic  Eyes:  anicteric; no injection  Respiratory:  non labored breathing on room air   Gait:  non antalgic, cane user:  no    ROM:    LUMBAR SPINE    Degree Pain   Flexion 90 No   Extension 30 No   Left SB 30 No   Right SB 30 No   Left SB/E 30 No   Right SB/E 30 No     CERVICAL SPINE    Degree Pain   Flexion 45 No   Extension 30 No   Left SB 30 No   Right SB 30 No   Left Rotation 80 No   Right Rotation 80 No     KNEES    Degree Pain   Right Flexion 120 No   Right Extension 0 No   Left Flexion 120 No   Left Extension 0 No       MOTOR EXAMINATION:    UPPER EXTREMITY    LEFT RIGHT   Deltoid 5/5 5/5   Biceps 5/5 5/5   Triceps 5/5 5/5   Brachioradialis 5/5 5/5   Wrist Flexors 5/5 5/5   Wrist Extensors 5/5 5/5   Intrinsic Hand 5/5 5/5    5/5 5/5     LOWER EXTREMITY    LEFT RIGHT   Iliopsoas 5/5 5/5    Quadriceps 5/5 5/5   Foot DF 5/5 5/5   Foot EHL 5/5 5/5   Gastrocnemius 5/5 5/5     PULSES    LEFT RIGHT   Radial 2/4 2/4   Dorsalis Pedis 2/4 2/4   Posterior Tibial 2/4 2/4   Brachial 2/4 2/4     SLR:  negative b/l   SIJ tenderness:  negative b/l   Cony's test:  negative b/l     Right Deep tendon reflexes:  symmetric   Left Deep tendon reflexes:  symmetric   Temperature:  normal to touch bilateral upper and lower extremities  Edema:  absent   Skin - normal     Sensation (light touch/pinprick/temperature):  Right Lower Extremity:  intact   Left Lower Extremity:  intact   Right Upper Extremity:  intact   Left Upper Extremity:  intact     IMAGING:  PROCEDURE: MRI SPINE LUMBAR (CPT=72148)     COMPARISON: None.     INDICATIONS: M54.42 Acute left-sided low back pain with left-sided sciatica M79.605 Left leg pain     TECHNIQUE: A variety of imaging planes and parameters were utilized for visualization of suspected pathology.        FINDINGS:  NUMERATION: For the purposes of this examination, the lowest fully formed disc space is designated L5-S1.    BONES: Levoscoliosis.  No acute fracture or malalignment.  Multilevel degenerative change with marginal osteophyte formation and facet arthropathy.  Degenerative endplate marrow signal changes noted including L3-L4.  CORD/CAUDA EQUINA: Normal caliber, contour, and signal intensity.  The conus terminates at T12-L1.  PARASPINAL AREA: Unremarkable.    OTHER: Small T2 hyperintense focus upper pole right kidney incompletely characterized but may reflect a cyst.  Small additional probable bilateral renal parapelvic cysts.     LUMBAR DISC LEVELS:  L1-L2: Circumferential disc bulge.  No significant central canal narrowing.  No significant foraminal narrowing.  L2-L3: Circumferential disc bulge with suspected bilateral superimposed left greater than right paracentral protrusions.  Mild ligamentum flavum hypertrophy.  Mild asymmetric central canal narrowing particularly on the left.   Mild bilateral foraminal  narrowing with facet arthropathy.  L3-L4: Circumferential disc bulge with mild ligamentum flavum hypertrophy.  Mild central canal narrowing.  Mild-to-moderate bilateral foraminal narrowing with facet arthropathy.  L4-L5: Circumferential disc bulge with mild ligamentum flavum hypertrophy.  Mild-to-moderate central canal narrowing.  Moderate left and mild-to-moderate right foraminal narrowing with facet arthropathy.  L5-S1: Circumferential disc bulge.  Mild prominence of the epidural fat.  Mild central canal narrowing.  Moderate left foraminal narrowing with facet arthropathy.  Mild right foraminal narrowing with facet arthropathy.               Impression   CONCLUSION:     Lumbar levoscoliosis with multilevel lumbar spine degenerative change as described above.  This includes mild-to-moderate central canal narrowing L4-L5 as well as mild L2-L3, L3-L4 and L5-S1 central canal narrowing with additional foraminal narrowing as  above.           Dictated by (CST): Renny Conroy MD on 3/23/2024 at 3:54 PM      Finalized by (CST): Renny Conroy MD on 3/23/2024 at 4:03 PM                 ASSESSMENT:  69 year old female with sudden onset of low back pain with radiation into the LLE due to multilevel degenerative changes resulting in central canal narrowing and foraminal narrowing.    PLAN:  RECOMMENDATIONS:  1)  Medical Modalities: cont tramadol prn, cont diclofenac prn   2)  Interventional Modalities: none indicated at this time; repeat when needed   3)  Other Modalities:  resume physical therapy   4)  Application for disability placard filled out today     Pt will return to clinic for  followup in 8 weeks.    Time spent: 18 minutes     Yeimy Rivera DO  Anesthesiology  Pain Medicine

## 2025-04-30 NOTE — PROGRESS NOTES
Patient presents in office today with reported pain in lower back, BLE    Current pain level reported = 0/10 while sitting      Last reported dose of Advil this morning.          Narcotic Contract renewal NA        Pain increases with ADL's,walking and activity can be 7/10 at its worst.  \"grinding in my lower half\".  Would like to discuss options       5/22/2024-DAI L4/5-JADIEL-90%

## 2025-05-01 ENCOUNTER — TELEPHONE (OUTPATIENT)
Dept: RHEUMATOLOGY | Facility: CLINIC | Age: 70
End: 2025-05-01

## 2025-05-01 ENCOUNTER — TELEPHONE (OUTPATIENT)
Dept: PAIN CLINIC | Facility: HOSPITAL | Age: 70
End: 2025-05-01

## 2025-05-01 DIAGNOSIS — M54.41 CHRONIC BILATERAL LOW BACK PAIN WITH BILATERAL SCIATICA: Primary | ICD-10-CM

## 2025-05-01 DIAGNOSIS — G89.29 CHRONIC BILATERAL LOW BACK PAIN WITH BILATERAL SCIATICA: Primary | ICD-10-CM

## 2025-05-01 DIAGNOSIS — M81.0 AGE-RELATED OSTEOPOROSIS WITHOUT CURRENT PATHOLOGICAL FRACTURE: Primary | ICD-10-CM

## 2025-05-01 DIAGNOSIS — M48.062 SPINAL STENOSIS OF LUMBAR REGION WITH NEUROGENIC CLAUDICATION: ICD-10-CM

## 2025-05-01 DIAGNOSIS — M54.42 CHRONIC BILATERAL LOW BACK PAIN WITH BILATERAL SCIATICA: Primary | ICD-10-CM

## 2025-05-01 NOTE — TELEPHONE ENCOUNTER
Call placed to patient, patient was in the car and will return call when she is able.    Price (Do Not Change): 0.00 Detail Level: Simple Instructions: This plan will send the code FBSE to the PM system.  DO NOT or CHANGE the price.

## 2025-05-01 NOTE — TELEPHONE ENCOUNTER
Order Questions    Question Answer Comment   Anesthesia Type Local    Provider [facility]    Location [facility]    Procedure Lumbar CHARLEEN L5-S1   CPT (Hit enter after each entry) [facility] INTERLAMINAR LMBR/SAC    C-ARM Yes    Medical clearance requested (will send to Pain Navigator) No    Patient has Medicare coverage? Yes      Patient has Medicare primary - no prior authorization required.

## 2025-05-01 NOTE — TELEPHONE ENCOUNTER
Patient advised of insurance approval to proceed with injections and is agreeable to scheduling. Patient scheduled for LESI on 5/15/25 at [facility] with Dr. Rivera, levels: L5/S1, laterality: n/a, pre-procedure instructions reviewed. Patient prefers local sedation. Reviewed sedation instructions including no fasting and no  is required. Patient advised but not required to hold NSAIDS for 24 hours prior to procedure. Patient verbalized understanding of instructions, no further needs at this time. Patient has been scheduled for follow up visit on: 5/29/25.      Case placed and signed.

## 2025-05-01 NOTE — TELEPHONE ENCOUNTER
Patient has appt for Evenity Injection 5/6/25. LOV 6/19/24.    Last Evenity given 12/2024. Patient states she had Injections in [place] in February,March and April. She needs labs. BMP pended.    Okay to have Evenity on 5/6/25?

## 2025-05-02 ENCOUNTER — TELEPHONE (OUTPATIENT)
Dept: RHEUMATOLOGY | Facility: CLINIC | Age: 70
End: 2025-05-02

## 2025-05-02 NOTE — TELEPHONE ENCOUNTER
Patient had Evenity Injections at Dr Feliciano Mack's office in [facility].Phoned office at [phone]) out to lunch, will call back later today.

## 2025-05-02 NOTE — TELEPHONE ENCOUNTER
Orders placed.  It is fine if she gets Evenity in May  Can we find out where she got the Evenity in [place] so I can get records of it

## 2025-05-02 NOTE — TELEPHONE ENCOUNTER
Left message to call back and give information as to where in [place] she had Evenity Injections.     Transfer to EXT [id number] today.

## 2025-05-05 ENCOUNTER — LAB ENCOUNTER (OUTPATIENT)
Dept: LAB | Age: 70
End: 2025-05-05
Attending: INTERNAL MEDICINE
Payer: MEDICARE

## 2025-05-05 DIAGNOSIS — I25.10 ATHEROSCLEROSIS OF NATIVE CORONARY ARTERY OF NATIVE HEART WITHOUT ANGINA PECTORIS: ICD-10-CM

## 2025-05-05 DIAGNOSIS — M81.0 AGE-RELATED OSTEOPOROSIS WITHOUT CURRENT PATHOLOGICAL FRACTURE: ICD-10-CM

## 2025-05-05 LAB
ALBUMIN SERPL-MCNC: 4.3 G/DL (ref 3.2–4.8)
ALBUMIN/GLOB SERPL: 1.7 {RATIO} (ref 1–2)
ALP LIVER SERPL-CCNC: 92 U/L (ref 55–142)
ALT SERPL-CCNC: 21 U/L (ref 10–49)
ANION GAP SERPL CALC-SCNC: 8 MMOL/L (ref 0–18)
AST SERPL-CCNC: 14 U/L (ref ?–34)
BILIRUB SERPL-MCNC: 0.4 MG/DL (ref 0.2–1.1)
BUN BLD-MCNC: 14 MG/DL (ref 9–23)
BUN/CREAT SERPL: 24.6 (ref 10–20)
CALCIUM BLD-MCNC: 9.1 MG/DL (ref 8.7–10.4)
CHLORIDE SERPL-SCNC: 106 MMOL/L (ref 98–112)
CHOLEST SERPL-MCNC: 211 MG/DL (ref ?–200)
CO2 SERPL-SCNC: 28 MMOL/L (ref 21–32)
CREAT BLD-MCNC: 0.57 MG/DL (ref 0.55–1.02)
EGFRCR SERPLBLD CKD-EPI 2021: 98 ML/MIN/1.73M2 (ref 60–?)
FASTING PATIENT LIPID ANSWER: YES
FASTING STATUS PATIENT QL REPORTED: YES
GLOBULIN PLAS-MCNC: 2.5 G/DL (ref 2–3.5)
GLUCOSE BLD-MCNC: 82 MG/DL (ref 70–99)
HDLC SERPL-MCNC: 72 MG/DL (ref 40–59)
LDLC SERPL CALC-MCNC: 121 MG/DL (ref ?–100)
NONHDLC SERPL-MCNC: 139 MG/DL (ref ?–130)
OSMOLALITY SERPL CALC.SUM OF ELEC: 294 MOSM/KG (ref 275–295)
POTASSIUM SERPL-SCNC: 3.9 MMOL/L (ref 3.5–5.1)
PROT SERPL-MCNC: 6.8 G/DL (ref 5.7–8.2)
SODIUM SERPL-SCNC: 142 MMOL/L (ref 136–145)
TRIGL SERPL-MCNC: 100 MG/DL (ref 30–149)
VIT D+METAB SERPL-MCNC: 45.3 NG/ML (ref 30–100)
VLDLC SERPL CALC-MCNC: 18 MG/DL (ref 0–30)

## 2025-05-05 PROCEDURE — 36415 COLL VENOUS BLD VENIPUNCTURE: CPT

## 2025-05-05 PROCEDURE — 82306 VITAMIN D 25 HYDROXY: CPT

## 2025-05-05 PROCEDURE — 80061 LIPID PANEL: CPT

## 2025-05-05 PROCEDURE — 80053 COMPREHEN METABOLIC PANEL: CPT

## 2025-05-06 ENCOUNTER — NURSE ONLY (OUTPATIENT)
Dept: RHEUMATOLOGY | Facility: CLINIC | Age: 70
End: 2025-05-06

## 2025-05-06 DIAGNOSIS — M81.0 AGE-RELATED OSTEOPOROSIS WITHOUT CURRENT PATHOLOGICAL FRACTURE: Primary | ICD-10-CM

## 2025-05-06 PROCEDURE — 96372 THER/PROPH/DIAG INJ SC/IM: CPT | Performed by: INTERNAL MEDICINE

## 2025-05-06 NOTE — PROGRESS NOTES
Name and  verified. Pt aware she was to receive Evenity injection. Patient stated she received January, March, and April in [place] with another provider. Injections given in bilateral upper arms. Patient tolerated well. She will follow up in 4 weeks for next injection.     Component      Latest Ref Rng 2025   Glucose      70 - 99 mg/dL 82    Sodium      136 - 145 mmol/L 142    Potassium      3.5 - 5.1 mmol/L 3.9    Chloride      98 - 112 mmol/L 106    Carbon Dioxide, Total      21.0 - 32.0 mmol/L 28.0    ANION GAP      0 - 18 mmol/L 8    BUN      9 - 23 mg/dL 14    CREATININE      0.55 - 1.02 mg/dL 0.57    BUN/CREATININE RATIO      10.0 - 20.0  24.6 (H)    CALCIUM      8.7 - 10.4 mg/dL 9.1    CALCULATED OSMOLALITY      275 - 295 mOsm/kg 294    EGFR      >=60 mL/min/1.73m2 98    ALT (SGPT)      10 - 49 U/L 21    AST (SGOT)      <34 U/L 14    ALKALINE PHOSPHATASE      55 - 142 U/L 92    Total Bilirubin      0.2 - 1.1 mg/dL 0.4    PROTEIN, TOTAL      5.7 - 8.2 g/dL 6.8    Albumin      3.2 - 4.8 g/dL 4.3    Globulin      2.0 - 3.5 g/dL 2.5    A/G Ratio      1.0 - 2.0  1.7    Patient Fasting for CMP? Yes    VITAMIN D, 25-OH, TOTAL      30.0 - 100.0 ng/mL 45.3       Legend:  (H) High

## 2025-05-15 PROBLEM — M48.062 SPINAL STENOSIS OF LUMBAR REGION WITH NEUROGENIC CLAUDICATION: Status: ACTIVE | Noted: 2025-05-15

## 2025-05-22 ENCOUNTER — TELEPHONE (OUTPATIENT)
Dept: RHEUMATOLOGY | Facility: CLINIC | Age: 70
End: 2025-05-22

## 2025-05-29 ENCOUNTER — OFFICE VISIT (OUTPATIENT)
Dept: PAIN CLINIC | Facility: HOSPITAL | Age: 70
End: 2025-05-29
Attending: ANESTHESIOLOGY
Payer: MEDICARE

## 2025-05-29 VITALS — DIASTOLIC BLOOD PRESSURE: 65 MMHG | SYSTOLIC BLOOD PRESSURE: 103 MMHG | HEART RATE: 65 BPM

## 2025-05-29 DIAGNOSIS — G89.29 CHRONIC BILATERAL LOW BACK PAIN WITH BILATERAL SCIATICA: ICD-10-CM

## 2025-05-29 DIAGNOSIS — M25.562 CHRONIC PAIN OF BOTH KNEES: ICD-10-CM

## 2025-05-29 DIAGNOSIS — M48.062 SPINAL STENOSIS OF LUMBAR REGION WITH NEUROGENIC CLAUDICATION: ICD-10-CM

## 2025-05-29 DIAGNOSIS — M54.41 CHRONIC BILATERAL LOW BACK PAIN WITH BILATERAL SCIATICA: ICD-10-CM

## 2025-05-29 DIAGNOSIS — M25.561 CHRONIC PAIN OF BOTH KNEES: ICD-10-CM

## 2025-05-29 DIAGNOSIS — M17.0 PRIMARY OSTEOARTHRITIS OF BOTH KNEES: Primary | ICD-10-CM

## 2025-05-29 DIAGNOSIS — G89.29 CHRONIC PAIN OF LEFT KNEE: ICD-10-CM

## 2025-05-29 DIAGNOSIS — M54.42 CHRONIC BILATERAL LOW BACK PAIN WITH BILATERAL SCIATICA: ICD-10-CM

## 2025-05-29 DIAGNOSIS — G89.29 CHRONIC PAIN OF BOTH KNEES: ICD-10-CM

## 2025-05-29 DIAGNOSIS — M25.562 CHRONIC PAIN OF LEFT KNEE: ICD-10-CM

## 2025-05-29 DIAGNOSIS — M17.12 PRIMARY OSTEOARTHRITIS OF LEFT KNEE: ICD-10-CM

## 2025-05-29 DIAGNOSIS — M54.17 LUMBOSACRAL RADICULOPATHY: ICD-10-CM

## 2025-05-29 PROCEDURE — 99214 OFFICE O/P EST MOD 30 MIN: CPT | Performed by: ANESTHESIOLOGY

## 2025-05-29 NOTE — PROGRESS NOTES
Last procedure: L5/S1 LESI  Date: 05.15.25  Percentage of relief obtained: 50%  Duration of relief: 2 weeks + until now     Current Pain Score: 0

## 2025-05-29 NOTE — CHRONIC PAIN
[facility]  Follow UP Visit Note         History of Present Illness:    Marlee Rush is a 69 year old female, originally referred to the pain clinic by Dr. Mohr ref. provider found,  for low back pain with radiation into the left lower extremity.   The patient returned today for follow up visit after L4-5 LESI done on 05/22/2024.   The patient reports successful resolution of the pain from the injection.   She reports 90-95% pain relief which is still ongoing. Today her pain is 0/10.  Denies any other issues. She would like to resume PT which was stopped due to the pain.         S/p LESI at L4-5 done on 05/22/2024 which relieved 90-95% helped   Soreness in both legs when walking   \"Posture off\" unable to stand or walk upright; prefers leaning forward when walking   Pain in the left knee; Dr. Bloom gives injections, last one in Jan 2025 but did not help   Never had gel injection   No n/t in feet or legs   No bladder/bowel incontinence   Takes advil prn sometimes none sometimes 2tabs in  AM and 2 at night; helps some   Acupuncture helped         Blood Thinning Medications:  None    Current Medications:  Current Outpatient Medications   Medication Sig Dispense Refill    CALCIUM OR Take by mouth.      Probiotic Product (PROBIOTIC ADVANCED) Oral Cap Take by mouth.      Multiple Vitamin (MULTI-VITAMIN DAILY) Oral Tab Take 1 tablet by mouth daily. (Patient not taking: No sig reported)      Omega-3 Fatty Acids (FISH OIL CONCENTRATE OR) Take 1 capsule by mouth in the morning.      Cholecalciferol (VITAMIN D3) 2000 units Oral Tab Take 1 capsule by mouth in the morning.      Vitamin C (VITAMIN C) 500 MG Oral Tab Take by mouth.          Allergies:  No Known Allergies     Review of Systems:  Bowel/Bladder Incontinence:  As above  Coughing/Sneezing/Straining does not exacerbate the pain.  Numbness/tingling:  As above  Weakness:  As above  Weight Loss:  Negative  Fever:  Negative  Cardiovascular:  No  current chest pain or palpitations  Respiratory:  No current shortness of breath  Gastrointestinal:  No active ulcer  Genitourinary:  Negative  Integumentary:  Negative  Psychiatric:  Negative  Hematologic:  No active bleeding  Lymphatic:  No current lymphedema  Allergic/Immunologic:  Negative  Musculoskeletal:  As above  Neurological:  As above  Denies chest pain, shortness of breath.    Medical History:  Patient Active Problem List   Diagnosis    Hyperlipidemia    Encounter for screening mammogram for breast cancer    Thyroid nodule    Vitamin D deficiency    Acute pain of left knee    Kyphosis of thoracic region    Osteopenia of multiple sites    Medicare annual wellness visit, subsequent    Screening for colon cancer    Age-related osteoporosis without current pathological fracture    Screening for heart disease    Foreign body finger    Lumbosacral radiculopathy    Spinal stenosis of lumbosacral region    Acute left-sided low back pain with left-sided sciatica    Scoliosis of thoracolumbar spine    Memory loss    Lumbar radiculopathy    Spinal stenosis of lumbar region without neurogenic claudication    Spinal stenosis of lumbar region with neurogenic claudication        Past Medical History:    High cholesterol    Obesity    Thyroid nodule    Visual impairment       Surgical History:  Past Surgical History:   Procedure Laterality Date    Colonoscopy      egd and colonoscopy 2012    Tubal ligation Bilateral 1987        Family History:   Family History   Problem Relation Age of Onset    Diabetes Father     Heart Disorder Father     Heart Disorder Mother     Diabetes Sister         Social History:  Social History     Socioeconomic History    Marital status:      Spouse name: Not on file    Number of children: Not on file    Years of education: Not on file    Highest education level: Not on file   Occupational History    Not on file   Tobacco Use    Smoking status: Former     Passive exposure: Past     Smokeless tobacco: Never    Tobacco comments:     quit smoking 1991; had smoked for  5 yrs only   Vaping Use    Vaping status: Never Used   Substance and Sexual Activity    Alcohol use: No     Alcohol/week: 0.0 standard drinks of alcohol    Drug use: No    Sexual activity: Not on file   Other Topics Concern     Service Not Asked    Blood Transfusions Not Asked    Caffeine Concern Yes     Comment: Coffee 1 cup daily; tea    Occupational Exposure Not Asked    Hobby Hazards Not Asked    Sleep Concern Not Asked    Stress Concern Not Asked    Weight Concern Not Asked    Special Diet Not Asked    Back Care Not Asked    Exercise No     Comment: physical therapy    Bike Helmet Not Asked    Seat Belt Not Asked    Self-Exams Not Asked   Social History Narrative    Not on file     Social Drivers of Health     Food Insecurity: Not on file   Transportation Needs: Not on file   Housing Stability: At Risk (8/27/2023)    Received from [facility]     Living Situation: Not on file     Housing Problems: Not on file        Physical Examination:  There were no vitals filed for this visit.       General:  Alert and oriented x3  Affect:  NAD  Head:  Normocephalic, atraumatic  Eyes:  anicteric; no injection  Respiratory:  non labored breathing on room air   Gait:  non antalgic, cane user:  no    ROM:    LUMBAR SPINE    Degree Pain   Flexion 90 No   Extension 30 No   Left SB 30 No   Right SB 30 No   Left SB/E 30 No   Right SB/E 30 No     CERVICAL SPINE    Degree Pain   Flexion 45 No   Extension 30 No   Left SB 30 No   Right SB 30 No   Left Rotation 80 No   Right Rotation 80 No     KNEES    Degree Pain   Right Flexion 120 No   Right Extension 0 No   Left Flexion 120 No   Left Extension 0 No       MOTOR EXAMINATION:    UPPER EXTREMITY    LEFT RIGHT   Deltoid 5/5 5/5   Biceps 5/5 5/5   Triceps 5/5 5/5   Brachioradialis 5/5 5/5   Wrist Flexors 5/5 5/5   Wrist Extensors 5/5 5/5   Intrinsic Hand 5/5 5/5    5/5 5/5      LOWER EXTREMITY    LEFT RIGHT   Iliopsoas 5/5 5/5   Quadriceps 5/5 5/5   Foot DF 5/5 5/5   Foot EHL 5/5 5/5   Gastrocnemius 5/5 5/5     PULSES    LEFT RIGHT   Radial 2/4 2/4   Dorsalis Pedis 2/4 2/4   Posterior Tibial 2/4 2/4   Brachial 2/4 2/4     SLR:  negative b/l   SIJ tenderness:  negative b/l   Cony's test:  negative b/l     Right Deep tendon reflexes:  symmetric   Left Deep tendon reflexes:  symmetric   Temperature:  normal to touch bilateral upper and lower extremities  Edema:  absent   Skin - normal     Sensation (light touch/pinprick/temperature):  Right Lower Extremity:  intact   Left Lower Extremity:  intact   Right Upper Extremity:  intact   Left Upper Extremity:  intact     IMAGING:  PROCEDURE: MRI SPINE LUMBAR (CPT=72148)     COMPARISON: None.     INDICATIONS: M54.42 Acute left-sided low back pain with left-sided sciatica M79.605 Left leg pain     TECHNIQUE: A variety of imaging planes and parameters were utilized for visualization of suspected pathology.        FINDINGS:  NUMERATION: For the purposes of this examination, the lowest fully formed disc space is designated L5-S1.    BONES: Levoscoliosis.  No acute fracture or malalignment.  Multilevel degenerative change with marginal osteophyte formation and facet arthropathy.  Degenerative endplate marrow signal changes noted including L3-L4.  CORD/CAUDA EQUINA: Normal caliber, contour, and signal intensity.  The conus terminates at T12-L1.  PARASPINAL AREA: Unremarkable.    OTHER: Small T2 hyperintense focus upper pole right kidney incompletely characterized but may reflect a cyst.  Small additional probable bilateral renal parapelvic cysts.     LUMBAR DISC LEVELS:  L1-L2: Circumferential disc bulge.  No significant central canal narrowing.  No significant foraminal narrowing.  L2-L3: Circumferential disc bulge with suspected bilateral superimposed left greater than right paracentral protrusions.  Mild ligamentum flavum hypertrophy.  Mild  asymmetric central canal narrowing particularly on the left.  Mild bilateral foraminal  narrowing with facet arthropathy.  L3-L4: Circumferential disc bulge with mild ligamentum flavum hypertrophy.  Mild central canal narrowing.  Mild-to-moderate bilateral foraminal narrowing with facet arthropathy.  L4-L5: Circumferential disc bulge with mild ligamentum flavum hypertrophy.  Mild-to-moderate central canal narrowing.  Moderate left and mild-to-moderate right foraminal narrowing with facet arthropathy.  L5-S1: Circumferential disc bulge.  Mild prominence of the epidural fat.  Mild central canal narrowing.  Moderate left foraminal narrowing with facet arthropathy.  Mild right foraminal narrowing with facet arthropathy.               Impression   CONCLUSION:     Lumbar levoscoliosis with multilevel lumbar spine degenerative change as described above.  This includes mild-to-moderate central canal narrowing L4-L5 as well as mild L2-L3, L3-L4 and L5-S1 central canal narrowing with additional foraminal narrowing as  above.           Dictated by (CST): Renny Conroy MD on 3/23/2024 at 3:54 PM      Finalized by (CST): Renny Conroy MD on 3/23/2024 at 4:03 PM                 ASSESSMENT:  69 year old female with sudden onset of low back pain with radiation into the LLE due to multilevel degenerative changes resulting in central canal narrowing and foraminal narrowing.    PLAN:  RECOMMENDATIONS:  1)  Medical Modalities: cont tramadol prn, cont diclofenac prn   2)  Interventional Modalities: none indicated at this time; repeat when needed   3)  Other Modalities:  resume physical therapy   4)  Application for disability placard filled out today     Pt will return to clinic for  followup in 8 weeks.    Time spent: 18 minutes     Yeimy Rivera DO  Anesthesiology  Pain Medicine         to clinic for synvisc injection     Time spent: 35 minutes     Yeimy Rivera, DO  Anesthesiology  Pain Medicine

## 2025-05-29 NOTE — PATIENT INSTRUCTIONS
Refill policies:     Contact your pharmacy at least 5 days prior to running out of medication and have them send an electronic request or submit request through the “request refill” option in your [facility] account.  Allow 3-5 business days for refills; controlled substances may take longer.  If your prescription is due for a refill, please make sure you have a follow up appointment scheduled with the appropriate prescribing physician.  To best provide you care, patients receiving routine medications need to be seen at least once a year.  Patients receiving narcotic/controlled substance medications need to be seen at least once every 3 months.  Patients receiving narcotic/controlled substance medications will be required to sign an Opioid Treatment Agreement/Contract.  Refills will not be refilled on weekends or holidays; on-call physicians will not refill routine medications.  No narcotics or controlled substances are refilled after noon on Fridays or by on-call physicians.  Federal Law states narcotics must be electronically prescribed.  A 30-day supply with no refills is the maximum allowed by law.  In the event that your preferred pharmacy does not have the requested medication in stock (e.g., Backordered), it is your responsibility to find another pharmacy that has the requested medication available.  We will gladly send a new prescription to that pharmacy at your request.

## 2025-06-04 PROBLEM — M54.41 CHRONIC BILATERAL LOW BACK PAIN WITH BILATERAL SCIATICA: Status: ACTIVE | Noted: 2025-06-04

## 2025-06-04 PROBLEM — G89.29 CHRONIC BILATERAL LOW BACK PAIN WITH BILATERAL SCIATICA: Status: ACTIVE | Noted: 2025-06-04

## 2025-06-04 PROBLEM — M54.42 CHRONIC BILATERAL LOW BACK PAIN WITH BILATERAL SCIATICA: Status: ACTIVE | Noted: 2025-06-04

## 2025-06-04 PROBLEM — M47.816 LUMBAR SPONDYLOSIS: Status: ACTIVE | Noted: 2025-06-04

## 2025-06-10 ENCOUNTER — NURSE ONLY (OUTPATIENT)
Dept: RHEUMATOLOGY | Facility: CLINIC | Age: 70
End: 2025-06-10

## 2025-06-10 VITALS — BODY MASS INDEX: 37 KG/M2 | HEIGHT: 62.5 IN

## 2025-06-10 DIAGNOSIS — M81.0 AGE-RELATED OSTEOPOROSIS WITHOUT CURRENT PATHOLOGICAL FRACTURE: Primary | ICD-10-CM

## 2025-06-10 PROBLEM — M25.561 CHRONIC PAIN OF BOTH KNEES: Status: ACTIVE | Noted: 2022-10-30

## 2025-06-10 PROBLEM — M25.562 CHRONIC PAIN OF BOTH KNEES: Status: ACTIVE | Noted: 2022-10-30

## 2025-06-10 PROBLEM — G89.29 CHRONIC PAIN OF BOTH KNEES: Status: ACTIVE | Noted: 2022-10-30

## 2025-06-10 PROBLEM — M17.0 PRIMARY OSTEOARTHRITIS OF BOTH KNEES: Status: ACTIVE | Noted: 2025-06-10

## 2025-06-10 PROCEDURE — 96372 THER/PROPH/DIAG INJ SC/IM: CPT | Performed by: INTERNAL MEDICINE

## 2025-06-10 NOTE — PROGRESS NOTES
Name and  verified. Pt aware he was to receive injection of Evenity. Injections given in Bilateral Upper Arms,and pt tolerated well. Possible local side effects discussed with pt. Pt aware to follow up in 4 weeks for next injection.

## 2025-06-10 NOTE — PROGRESS NOTES
Name and  verified. Pt aware he was to receive injection of Evenity. Injections given in Bilateral Upper Arms,and pt tolerated well. Possible local side effects discussed with pt. Pt aware to follow up in 4 weeks for next injection which will be her last. First injection given 24. Previous note deleted in error.

## 2025-07-10 ENCOUNTER — TELEPHONE (OUTPATIENT)
Dept: INTERNAL MEDICINE CLINIC | Facility: CLINIC | Age: 70
End: 2025-07-10

## 2025-07-10 ENCOUNTER — OFFICE VISIT (OUTPATIENT)
Dept: INTERNAL MEDICINE CLINIC | Facility: CLINIC | Age: 70
End: 2025-07-10

## 2025-07-10 VITALS
SYSTOLIC BLOOD PRESSURE: 126 MMHG | BODY MASS INDEX: 39.86 KG/M2 | DIASTOLIC BLOOD PRESSURE: 78 MMHG | HEIGHT: 62.5 IN | WEIGHT: 222.19 LBS | HEART RATE: 53 BPM

## 2025-07-10 DIAGNOSIS — Z00.00 MEDICARE ANNUAL WELLNESS VISIT, SUBSEQUENT: Primary | ICD-10-CM

## 2025-07-10 DIAGNOSIS — E04.1 THYROID NODULE: ICD-10-CM

## 2025-07-10 DIAGNOSIS — M81.0 AGE-RELATED OSTEOPOROSIS WITHOUT CURRENT PATHOLOGICAL FRACTURE: ICD-10-CM

## 2025-07-10 DIAGNOSIS — Z12.31 ENCOUNTER FOR SCREENING MAMMOGRAM FOR BREAST CANCER: ICD-10-CM

## 2025-07-10 DIAGNOSIS — Z12.11 SCREENING FOR COLON CANCER: ICD-10-CM

## 2025-07-10 DIAGNOSIS — I25.10 ATHEROSCLEROSIS OF NATIVE CORONARY ARTERY OF NATIVE HEART WITHOUT ANGINA PECTORIS: ICD-10-CM

## 2025-07-10 DIAGNOSIS — E78.00 PURE HYPERCHOLESTEROLEMIA: ICD-10-CM

## 2025-07-10 DIAGNOSIS — Z13.6 SCREENING FOR HEART DISEASE: ICD-10-CM

## 2025-07-10 DIAGNOSIS — E55.9 VITAMIN D DEFICIENCY: ICD-10-CM

## 2025-07-10 RX ORDER — ROSUVASTATIN CALCIUM 10 MG/1
10 TABLET, COATED ORAL NIGHTLY
Qty: 90 TABLET | Refills: 0 | Status: SHIPPED | OUTPATIENT
Start: 2025-07-10

## 2025-07-10 NOTE — TELEPHONE ENCOUNTER
Cologuard order requisition form completed and faxed to [employer] at [phone], confirmation received.

## 2025-07-10 NOTE — PROGRESS NOTES
Subjective:   Marlee Rush is a 70 year old female who presents for a Subsequent Annual Wellness visit (Pt already had Initial Annual Wellness) and scheduled follow up of multiple significant but stable problems.               History/Other:   Fall Risk Assessment:   She has been screened for Falls and is low risk.      Cognitive Assessment:   She had a completely normal cognitive assessment - see flowsheet entries     Functional Ability/Status:   Marlee Rush has some abnormal functions as listed below:  She has Walking problems based on screening of functional status.       Depression Screening (PHQ):  PHQ-2 SCORE: 1  , done 7/10/2025   Feeling down, depressed, or hopeless: 1             Advanced Directives:   She does have a Living Will but we do NOT have it on file in Epic.    She does have a POA but we do NOT have it on file in [facility].    Patient has Advance Care Planning documents present in EMR. Reviewed documents with patient (and family/surrogate if present).      Problem List[1]  Allergies:  She has no known allergies.    Current Medications:  Active Meds, Sig Only[2]    Medical History:  She  has a past medical history of Atherosclerosis of native coronary artery of native heart without angina pectoris (7/12/2025), High cholesterol, Obesity, Thyroid nodule, and Visual impairment.  Surgical History:  She  has a past surgical history that includes tubal ligation (Bilateral, 1987) and colonoscopy.   Family History:  Her family history includes Diabetes in her father and sister; Heart Disorder in her father and mother.  Social History:  She  reports that she has quit smoking. She has been exposed to tobacco smoke. She has never used smokeless tobacco. She reports that she does not drink alcohol and does not use drugs.    Tobacco:  She smoked tobacco in the past but quit greater than 12 months ago.  Tobacco Use[3]     CAGE Alcohol Screen:   CAGE screening score of 0 on 7/10/2025, showing low risk of alcohol  abuse.      Patient Care Team:  Jose Luis Carlson MD as PCP - General (Internal Medicine)  Chuckie South DO as Consulting Physician (NEUROLOGY)    Review of Systems  GENERAL: feels well otherwise  SKIN: denies any unusual skin lesions  EYES: denies blurred vision or double vision  HEENT: denies nasal congestion, sinus pain or ST  LUNGS: denies shortness of breath with exertion  CARDIOVASCULAR: denies chest pain on exertion; no pnd/syncope  GI: denies abdominal pain, denies heartburn  no hematochezia  : denies dysuria, vaginal discharge or itching, no complaint of urinary incontinence   MUSCULOSKELETAL: denies back pain  NEURO: denies headaches  PSYCHE: denies depression or anxiety  HEMATOLOGIC: denies hx of anemia  ENDOCRINE: denies thyroid history  ALL/ASTHMA: denies hx of allergy or asthma    Objective:   Physical Exam  General Appearance:  Alert, cooperative, no distress, appears stated age   Head:  Normocephalic, without obvious abnormality, atraumatic   Eyes:  PERRL, conjunctiva/corneas clear, EOM's intact both eyes   Ears:  Normal TM's and external ear canals, both ears   Nose: Nares normal, septum midline,mucosa normal, no drainage or sinus tenderness   Throat: Lips, mucosa, and tongue normal; teeth and gums normal   Neck: Supple, symmetrical, trachea midline, no adenopathy;  thyroid: not enlarged, symmetric, no tenderness/mass/nodules; no carotid bruit or JVD   Back:   Symmetric, no curvature, ROM normal, no CVA tenderness   Lungs:   Clear to auscultation bilaterally, respirations unlabored   Heart:  Regular rate and rhythm, S1 and S2 normal, no murmur, rub, or gallop   Abdomen:   Soft, non-tender, bowel sounds active all four quadrants,  no masses, no organomegaly   Pelvic: Deferred   Extremities: Extremities normal, atraumatic, no cyanosis or edema   Pulses: 2+ and symmetric   Skin: Skin color, texture, turgor normal, no rashes or lesions   Lymph nodes: Cervical, supraclavicular,  nodes normal    Neurologic: Normal       /78   Pulse 53   Ht 5' 2.5\" (1.588 m)   Wt 222 lb 3 oz (100.8 kg)   BMI 39.99 kg/m²  Estimated body mass index is 39.99 kg/m² as calculated from the following:    Height as of this encounter: 5' 2.5\" (1.588 m).    Weight as of this encounter: 222 lb 3 oz (100.8 kg).    Medicare Hearing Assessment:   Hearing Screening    Screening Method: Finger Rub  Finger Rub Result: Pass         Visual Acuity:   Right Eye Visual Acuity: Corrected Right Eye Chart Acuity: 20/20   Left Eye Visual Acuity: Corrected Left Eye Chart Acuity: 20/15   Both Eyes Visual Acuity: Corrected Both Eyes Chart Acuity: 20/13   Able To Tolerate Visual Acuity: Yes        Assessment & Plan:   Marlee Rush is a 70 year old female who presents for a Medicare Assessment.     1. Medicare annual wellness visit, subsequent  Routine labs have been ordered.  Patient declined to get her COVID booster.  Advised with routine COVID-vaccine and shingles vaccine.    2. Pure hypercholesterolemia  Lipid panel.  Patient get started on depressed type components.  - Lipid Panel; Future  - Comp Metabolic Panel (14); Future    3. Atherosclerosis of native coronary artery of native heart without angina pectoris  CAC score.  Restart statin.  Would like to control his cholesterol levels we can start on atorvastatin 10 mg daily check lipid panel in 3 months.    4. Age-related osteoporosis without current pathological fracture  Patient being managed and treated by rheumatology and gets GLP-1 agonist weekly.    5. Thyroid nodule  This is recommended followed for no further follow-up is needed.    6. Vitamin D deficiency  Continue vitamin D supplement.    7. Encounter for screening mammogram for breast cancer  Mammogram ordered.  - [facility] 2D+3D SCREENING BILAT (CPT=77067/41442); Future    8. Screening for heart disease  Patient recommended to do screening with heart calcium test.    9. Screening for colon cancer     Patient chose to do  Cologuard testing.      The patient indicates understanding of these issues and agrees to the plan.  Reinforced healthy diet, lifestyle, and exercise.        No follow-ups on file.     Jose Luis Carlson MD, 7/10/2025     Supplementary Documentation:   General Health:  In the past six months, have you lost more than 10 pounds without trying?: 2 - No  Has your appetite been poor?: No  Type of Diet: Balanced  How does the patient maintain a good energy level?: Appropriate Exercise  How would you describe your daily physical activity?: Light  How would you describe your current health state?: Fair  How do you maintain positive mental well-being?: Visiting Family  On a scale of 0 to 10, with 0 being no pain and 10 being severe pain, what is your pain level?: 1 - (Mild)  In the past six months, have you experienced urine leakage?: 1-Yes  At any time do you feel concerned for the safety/well-being of yourself and/or your children, in your home or elsewhere?: No  Have you had any immunizations at another office such as Influenza, Hepatitis B, Tetanus, or Pneumococcal?: No    Health Maintenance   Topic Date Due    Pneumococcal Vaccine: 50+ Years (1 of 2 - PCV) Never done    Zoster Vaccines (1 of 2) Never done    Colorectal Cancer Screening  04/15/2023    COVID-19 Vaccine (4 - 2024-25 season) 09/01/2024    Mammogram  07/22/2025    Influenza Vaccine (1) 10/01/2025    Annual Physical  07/10/2026    DEXA Scan  Completed    Annual Depression Screening  Completed    Fall Risk Screening (Annual)  Completed    Meningococcal B Vaccine  Aged Out            [1]   Patient Active Problem List  Diagnosis    Hyperlipidemia    Encounter for screening mammogram for breast cancer    Thyroid nodule    Vitamin D deficiency    Chronic pain of both knees    Kyphosis of thoracic region    Osteopenia of multiple sites    Medicare annual wellness visit, subsequent    Screening for colon cancer    Age-related osteoporosis without current  pathological fracture    Screening for heart disease    Foreign body finger    Lumbosacral radiculopathy    Spinal stenosis of lumbosacral region    Acute left-sided low back pain with left-sided sciatica    Scoliosis of thoracolumbar spine    Memory loss    Lumbar radiculopathy    Spinal stenosis of lumbar region without neurogenic claudication    Spinal stenosis of lumbar region with neurogenic claudication    Chronic bilateral low back pain with bilateral sciatica    Lumbar spondylosis    Primary osteoarthritis of both knees    Atherosclerosis of native coronary artery of native heart without angina pectoris   [2]   Outpatient Medications Marked as Taking for the 7/10/25 encounter (Office Visit) with Jose Luis Carlson MD   Medication Sig    rosuvastatin 10 MG Oral Tab Take 1 tablet (10 mg total) by mouth nightly.    NON FORMULARY PT TAKES UMARY SUPPLEMENT DAILY    CALCIUM OR Take by mouth.    Probiotic Product (PROBIOTIC ADVANCED) Oral Cap Take by mouth.    Multiple Vitamin (MULTI-VITAMIN DAILY) Oral Tab Take 1 tablet by mouth daily.    Omega-3 Fatty Acids (FISH OIL CONCENTRATE OR) Take 1 capsule by mouth in the morning.    Cholecalciferol (VITAMIN D3) 2000 units Oral Tab Take 1 capsule by mouth in the morning.    Vitamin C (VITAMIN C) 500 MG Oral Tab Take by mouth.     Current Facility-Administered Medications for the 7/10/25 encounter (Office Visit) with Jose Luis Carlson MD   Medication    Romosozumab-aqqg SOSY 210 mg    triamcinolone acetonide (Kenalog-40) 40 MG/ML injection 40 mg   [3]   Social History  Tobacco Use   Smoking Status Former    Passive exposure: Past   Smokeless Tobacco Never   Tobacco Comments    quit smoking 1991; had smoked for  5 yrs only

## 2025-07-12 PROBLEM — I25.10 ATHEROSCLEROSIS OF NATIVE CORONARY ARTERY OF NATIVE HEART WITHOUT ANGINA PECTORIS: Status: ACTIVE | Noted: 2025-07-12

## 2025-07-15 ENCOUNTER — NURSE ONLY (OUTPATIENT)
Age: 70
End: 2025-07-15

## 2025-07-15 ENCOUNTER — TELEPHONE (OUTPATIENT)
Age: 70
End: 2025-07-15

## 2025-07-15 DIAGNOSIS — M81.0 AGE-RELATED OSTEOPOROSIS WITHOUT CURRENT PATHOLOGICAL FRACTURE: Primary | ICD-10-CM

## 2025-07-15 PROCEDURE — 96372 THER/PROPH/DIAG INJ SC/IM: CPT | Performed by: INTERNAL MEDICINE

## 2025-07-15 NOTE — PROGRESS NOTES
Name and  verified. Pt aware she was to receive injection of Evenity. Injection given in Bilateral Upper Arms and pt tolerated well. Possible local side effects discussed with pt. Patient has completed 1 yr of Evenity.Today was her last injection.

## 2025-07-15 NOTE — TELEPHONE ENCOUNTER
Patient had 1st Evenity injection 7/19/24, she missed Jan 2025 injection while in [place]. Is today is her 12th injection although she missed in January? Please advise.

## 2025-07-15 NOTE — TELEPHONE ENCOUNTER
I calculated the doses and she had and it was 12.  Looks like she has completed 1 year of Evenity    She needs to make a follow-up to discuss next steps.  She can be put on one of the on hold spots for July of August

## 2025-07-16 NOTE — TELEPHONE ENCOUNTER
Name and  verified. Pt advised of provider message below. Pt agreeable with plan and scheduled follow up appointment.      Future Appointments   Date Time Provider Department Center   2025  2:40 PM [facility]   2025 12:00 PM Yeimy Rivera DO EM PA[facility]

## 2025-07-22 ENCOUNTER — OFFICE VISIT (OUTPATIENT)
Age: 70
End: 2025-07-22

## 2025-07-22 VITALS
HEART RATE: 60 BPM | WEIGHT: 222 LBS | BODY MASS INDEX: 39.83 KG/M2 | HEIGHT: 62.5 IN | SYSTOLIC BLOOD PRESSURE: 129 MMHG | DIASTOLIC BLOOD PRESSURE: 73 MMHG

## 2025-07-22 DIAGNOSIS — M81.0 AGE-RELATED OSTEOPOROSIS WITHOUT CURRENT PATHOLOGICAL FRACTURE: Primary | ICD-10-CM

## 2025-07-22 PROCEDURE — 99214 OFFICE O/P EST MOD 30 MIN: CPT | Performed by: INTERNAL MEDICINE

## 2025-07-22 PROCEDURE — G2211 COMPLEX E/M VISIT ADD ON: HCPCS | Performed by: INTERNAL MEDICINE

## 2025-07-22 NOTE — PROGRESS NOTES
Marlee Rush is a 70 year old female.    HPI:     Chief Complaint   Patient presents with    Osteoporosis    Follow - Up     Discuss Plan after finishing the 12 injections of the Evenity        I had the pleasure of seeing Marlee Rush on 7/22/2025 for follow up Osteoporosis    Current Medications:  Calcium and vitamin D  Evenity monthly- started 7/19/2024, last dose 7/15/2025, completed 12 mos   Blood work:  +CAROLYNN but repeat in 2012 negative  Neg dsDNA  Bone density 1/2022-->6/2024  LFN T-score    -2.2           -2.2  LTH                 -0.4           -1.5  LS                    -0.6          -0.3    Interval History:  This is a 66 yo F with hx of Osteopenia presents with curvature of her spine.  She has some mild back pain but not severe.  It is on and off.  Can be exacerbated by picking up her grandson.  She is noticed curvature of her spine over the last couple of years and is concerned about this.  She also has some chronic left knee pain.  X-ray of the left knee was normal.  She will be having an MRI of the left knee next week.  She has difficulty ambulating due to her left knee pain.  No swelling.  Denies any other joint pain or swelling.  No rashes, psoriasis or GI issues.  She had a bone density done showing osteopenia in the left hip.  X-ray of the thoracic spine did show mild chronic anterior wedging of T11 and T12 vertebrae's associated with kyphosis..  She started menopause around age 52.  No hormone replacement therapy.  She takes vitamin D 2000 units daily.  No history of fragility fractures.  She does not exercise.  History of smoking but quit 30 years ago.  No excessive alcohol use.    6/18/2024:  Presents for f/u of Osteoporosis  She was prescribed fosamax in 2022 but never took it, was concerned about the s/e   She is also following with pain management for lower back pain and received an epidural injection 5/22 which helped pain but now not walking different so will be starting PT  No recent  falls or fractures     7/22/2025:  Presents for f/u of Osteoporosis  She was on Evenity for 12 months, last dose 7/15/2025  No recent falls or fractures   She has chronic lower back pain and sees pain mgt and received LESI at L5-S1 5/15/2025  She will getting gel injection in left knee soon        HISTORY:  Past Medical History:    Atherosclerosis of native coronary artery of native heart without angina pectoris    High cholesterol    Obesity    Thyroid nodule    Visual impairment      Social Hx Reviewed   Family Hx Reviewed     Medications (Active prior to today's visit):  Current Outpatient Medications   Medication Sig Dispense Refill    rosuvastatin 10 MG Oral Tab Take 1 tablet (10 mg total) by mouth nightly. (Patient not taking: Reported on 7/22/2025) 90 tablet 0    NON FORMULARY PT TAKES UMARY SUPPLEMENT DAILY      CALCIUM OR Take by mouth.      Probiotic Product (PROBIOTIC ADVANCED) Oral Cap Take by mouth.      Multiple Vitamin (MULTI-VITAMIN DAILY) Oral Tab Take 1 tablet by mouth daily.      Omega-3 Fatty Acids (FISH OIL CONCENTRATE OR) Take 1 capsule by mouth in the morning.      Cholecalciferol (VITAMIN D3) 2000 units Oral Tab Take 1 capsule by mouth in the morning.      Vitamin C (VITAMIN C) 500 MG Oral Tab Take by mouth.       .cmed  Allergies:  No Known Allergies      ROS:   All other ROS are negative.     PHYSICAL EXAM:   GEN: AAOx3, NAD  HEENT: EOMI, PERRLA, no injection or icterus, oral mucosa moist, no oral lesions. No lymphadenopathy. No facial rash  CVS: RRR, no murmurs rubs or gallops. Equal 2+ distal pulses.   LUNGS: CTAB, no increased work of breathing  ABDOMEN:  soft NT/ND, +BS, no HSM  SKIN: No rashes or skin lesions. No nail findings  MSK:  Cervical spine: FROM  Hands: no synovitis in DIP, PIP and MCP, strong full fists  Wrist: FROM, no pain or swelling or warmth on palpation  Elbow: FROM, no pain or swelling or warmth on palpation  Shoulders: FROM, no pain or swelling or warmth on  palpation  Hip: normal log roll, no lateral hip pain, ELVIN test negative b/l  Knees: FROM, no warmth or effusion present. No pain with ROM.   Ankles: FROM, no pain or swelling or warmth on palpation  Feet: no pain with MTP squeeze, no toe swelling or pain or warmth on palpation with FROM  Spine: no lumbar or sacral pain on palpation.  NEURO: Cranial nerves II-XII intact grossly. 5/5 strength throughout in both upper and lower extremities, sensation intact.  PSYCH: normal mood       LABS:     Reviewed    Imaging:     XR thoracic spine:  1. Mild chronic anterior wedging of lower thoracic vertebral bodies associated with kyphosis.   2. Multilevel degenerative disc disease/spondylosis most pronounced in the mid and lower thoracic spine.      XR Lumbar spine 2015:  No acute fracture. Progression of multilevel degenerative changes,   particularly degenerative disc disease at L2-L3.     Redemonstrated mild scoliosis.     There is facet arthropathy most pronounced on the left at L5-S1.     ASSESSMENT/PLAN:     Osteoporosis  - Bone density in January 2022 showed left femoral neck T score -2.2.  Thoracic x-ray is showing evidence of anterior wedging fracture which puts her in the category of osteoporosis no  - Completed 12 months of Evenity, last dose 7/15/2025  - Plan to get approved for Prolia injections every 6 months.  Risk/benefits discussed with patient.  Will get blood work 1 week prior to every Prolia injection  - She will continue calcium 600 mg daily and vitamin D 2000 units daily  - Blood work today     Chronic lower back pain  - She is following with pain management.  She had an epidural steroid injection recently     Thoracic kyphosis  - This is likely due to her osteoporosis, x-ray showing mild chronic anterior wedging of the T11 and T12 vertebral bodies     L knee pain  - MRI in 2022 showed moderate patellofemoral OA and meniscus tear    Pt will f/u in 6 mos     There is a longitudinal care relationship with  me, the care plan reflects the ongoing nature of the continuous relationship of care, and the medical record indicates that there is ongoing treatment of a serious/complex medical condition which I am currently managing.  is Applicable.     Leah Chauhan MD  7/22/2025  2:30 PM

## 2025-07-22 NOTE — PATIENT INSTRUCTIONS
You were seen today for osteoporosis  You completed 12 months of Evenity  Plan to get you approved for Prolia which is an every 6-month injection  Earliest dose will be August 15, 2025  Continue calcium and vitamin D  You can see me yearly

## 2025-07-24 ENCOUNTER — TELEPHONE (OUTPATIENT)
Age: 70
End: 2025-07-24

## 2025-08-05 ENCOUNTER — LAB ENCOUNTER (OUTPATIENT)
Dept: LAB | Age: 70
End: 2025-08-05
Attending: INTERNAL MEDICINE

## 2025-08-05 DIAGNOSIS — M81.0 AGE-RELATED OSTEOPOROSIS WITHOUT CURRENT PATHOLOGICAL FRACTURE: ICD-10-CM

## 2025-08-05 LAB
ANION GAP SERPL CALC-SCNC: 4 MMOL/L (ref 0–18)
BUN BLD-MCNC: 15 MG/DL (ref 9–23)
BUN/CREAT SERPL: 25.9 (ref 10–20)
CALCIUM BLD-MCNC: 9.1 MG/DL (ref 8.7–10.4)
CHLORIDE SERPL-SCNC: 107 MMOL/L (ref 98–112)
CO2 SERPL-SCNC: 29 MMOL/L (ref 21–32)
CREAT BLD-MCNC: 0.58 MG/DL (ref 0.55–1.02)
EGFRCR SERPLBLD CKD-EPI 2021: 97 ML/MIN/1.73M2 (ref 60–?)
FASTING STATUS PATIENT QL REPORTED: NO
GLUCOSE BLD-MCNC: 99 MG/DL (ref 70–99)
OSMOLALITY SERPL CALC.SUM OF ELEC: 291 MOSM/KG (ref 275–295)
POTASSIUM SERPL-SCNC: 3.6 MMOL/L (ref 3.5–5.1)
SODIUM SERPL-SCNC: 140 MMOL/L (ref 136–145)
VIT D+METAB SERPL-MCNC: 35 NG/ML (ref 30–100)

## 2025-08-05 PROCEDURE — 36415 COLL VENOUS BLD VENIPUNCTURE: CPT

## 2025-08-05 PROCEDURE — 80048 BASIC METABOLIC PNL TOTAL CA: CPT

## 2025-08-05 PROCEDURE — 82306 VITAMIN D 25 HYDROXY: CPT

## 2025-08-06 ENCOUNTER — OFFICE VISIT (OUTPATIENT)
Dept: PAIN CLINIC | Facility: HOSPITAL | Age: 70
End: 2025-08-06
Attending: ANESTHESIOLOGY

## 2025-08-06 VITALS — DIASTOLIC BLOOD PRESSURE: 77 MMHG | OXYGEN SATURATION: 94 % | SYSTOLIC BLOOD PRESSURE: 122 MMHG | HEART RATE: 56 BPM

## 2025-08-06 DIAGNOSIS — M25.562 CHRONIC PAIN OF LEFT KNEE: ICD-10-CM

## 2025-08-06 DIAGNOSIS — G89.29 CHRONIC PAIN OF LEFT KNEE: ICD-10-CM

## 2025-08-06 DIAGNOSIS — G89.29 CHRONIC BILATERAL LOW BACK PAIN WITH BILATERAL SCIATICA: ICD-10-CM

## 2025-08-06 DIAGNOSIS — M54.41 CHRONIC BILATERAL LOW BACK PAIN WITH BILATERAL SCIATICA: ICD-10-CM

## 2025-08-06 DIAGNOSIS — M54.42 CHRONIC BILATERAL LOW BACK PAIN WITH BILATERAL SCIATICA: ICD-10-CM

## 2025-08-06 DIAGNOSIS — M17.12 PRIMARY OSTEOARTHRITIS OF LEFT KNEE: Primary | ICD-10-CM

## 2025-08-06 PROCEDURE — 20610 DRAIN/INJ JOINT/BURSA W/O US: CPT | Performed by: ANESTHESIOLOGY

## 2025-08-07 PROBLEM — G89.29 CHRONIC PAIN OF LEFT KNEE: Status: ACTIVE | Noted: 2022-10-30

## 2025-08-07 PROBLEM — M25.562 CHRONIC PAIN OF LEFT KNEE: Status: ACTIVE | Noted: 2022-10-30

## 2025-08-07 PROBLEM — M17.12 PRIMARY OSTEOARTHRITIS OF LEFT KNEE: Status: ACTIVE | Noted: 2025-08-07

## 2025-08-11 ENCOUNTER — TELEPHONE (OUTPATIENT)
Dept: PAIN CLINIC | Facility: HOSPITAL | Age: 70
End: 2025-08-11

## 2025-08-11 DIAGNOSIS — M17.12 PRIMARY OSTEOARTHRITIS OF LEFT KNEE: ICD-10-CM

## 2025-08-11 DIAGNOSIS — M25.562 CHRONIC PAIN OF LEFT KNEE: Primary | ICD-10-CM

## 2025-08-11 DIAGNOSIS — G89.29 CHRONIC PAIN OF LEFT KNEE: Primary | ICD-10-CM

## 2025-08-18 ENCOUNTER — NURSE ONLY (OUTPATIENT)
Age: 70
End: 2025-08-18

## 2025-08-18 DIAGNOSIS — M81.0 AGE-RELATED OSTEOPOROSIS WITHOUT CURRENT PATHOLOGICAL FRACTURE: Primary | ICD-10-CM

## (undated) DIAGNOSIS — Z11.52 ENCOUNTER FOR SCREENING FOR COVID-19: Primary | ICD-10-CM

## (undated) DIAGNOSIS — R92.8 ABNORMAL MAMMOGRAM: Primary | ICD-10-CM

## (undated) NOTE — Clinical Note
Patient Name: Cheri Abdalla  : 1955  MRN: [id number]  Patient Address: [address] [place] [address]      Coronavirus Disease 2019 (COVID-19)     [facility] is committed to the safety and well-being of our patients, members, employees, your symptoms get worse, call your healthcare provider immediately. 3. Get rest and stay hydrated.    4. If you have a medical appointment, call the healthcare provider ahead of time and tell them that you have or may have COVID-19.  5. For medical emergen fever-reducing medications; and  · Improvement in respiratory symptoms (e.g., cough, shortness of breath); and  · At least 10 days have passed since symptoms first appeared OR if asymptomatic patient or date of symptom onset is unclear then use 10 days pos donors must:    · Have had a confirmed diagnosis of COVID-19  · Be symptom-free for at least 14 days*    *Some people will be required to have a repeat COVID-19 test in order to be eligible to donate.  If you’re instructed by [facility] that a repeat test is r random. Researchers are trying to identify similarities between people with a Post-COVID condition to better understand if there are risk factors. How do I prevent a Post-COVID condition?   The best way to prevent the long-term symptoms of COVID-19 is

## (undated) NOTE — MR AVS SNAPSHOT
[address], [address]  [address] [facility]  [phone]               Thank you for choosing us for your health care visit with Rj Bello MD.  We are glad to serve you and happy to provide you with this s Fluticasone Propionate  MCG/ACT Aero   Inhale 2 puffs into the lungs 2 (two) times daily. Commonly known as:  FLOVENT HFA           * guaiFENesin-codeine 100-10 MG/5ML Soln   Take 5 mL by mouth every 6 (six) hours as needed for cough.    Commonly [facility]  Diagnostics [facility] Parking) (Yellow Parking)  [address]   [address] [facility],  Domingo Alpesh Valentin, [address] [facility]  [address]  [address]

## (undated) NOTE — LETTER
AUTHORIZATION FOR SURGICAL OPERATION OR OTHER PROCEDURE    1. I hereby authorize PATY Lam, and [facility] staff assigned to my case to perform the following operation and/or procedure at the [place] [facility]:    _______________Cortisone Injection left knee________________________      _______________________________________________________________________________________________    2. My physician has explained the nature and purpose of the operation or other procedure, possible alternative methods of treatment, the risks involved, and the possibility of complication to me. I acknowledge that no guarantee has been made as to the result that may be obtained. 3.  I recognize that, during the course of this operation, or other procedure, unforseen conditions may necessitate additional or different procedure than those listed above. I, therefore, further authorize and request that the above named physician, his/her physician assistants or designees perform such procedures as are, in his/her professional opinion, necessary and desirable. 4.  Any tissue or organs removed in the operation or other procedure may be disposed of by and at the discretion of the [place] [facility] and [facility]. 5.  I understand that in the event of a medical emergency, I will be transported by local paramedics to [facility] or other hospital emergency department. 6.  I certify that I have read and fully understand the above consent to operation and/or other procedure. 7.  I acknowledge that my physician has explained sedation/analgesia administration to me including the risks and benefits. I consent to the administration of sedation/analgesia as may be necessary or desirable in the judgement of my physician. Witness signature: ___________________________________________________ Date:  ______/______/_____                    Time:  ________ A. M.  P.M.        Patient Name: ______________________________________________________  (please print)      Patient signature:  ___________________________________________________             Relationship to Patient:           []  Parent    Responsible person                          []  Spouse  In case of minor or                    [] Other  _____________   Incompetent name:  __________________________________________________                               (please print)      _____________      Responsible person  In case of minor or  Incompetent signature:  _______________________________________________    Statement of Physician  My signature below affirms that prior to the time of the procedure, I have explained to the patient and/or his/her guardian, the risks and benefits involved in the proposed treatment and any reasonable alternative to the proposed treatment. I have also explained the risks and benefits involved in the refusal of the proposed treatment and have answered the patient's questions.                         Date:  ______/______/_______  Provider                      Signature:  __________________________________________________________       Time:  ___________ A.M    P.M.

## (undated) NOTE — LETTER
AUTHORIZATION FOR SURGICAL OPERATION OR OTHER PROCEDURE    1. I hereby authorize Feliciano Sosa, and [facility] staff assigned to my case to perform the following operation and/or procedure at the [facility] site:    _______________________________________________________________________________________________     left knee cortisone injection  _______________________________________________________________________________________________    2.  My physician has explained the nature and purpose of the operation or other procedure, possible alternative methods of treatment, the risks involved, and the possibility of complication to me.  I acknowledge that no guarantee has been made as to the result that may be obtained.  3.  I recognize that, during the course of this operation, or other procedure, unforseen conditions may necessitate additional or different procedure than those listed above.  I, therefore, further authorize and request that the above named physician, his/her physician assistants or designees perform such procedures as are, in his/her professional opinion, necessary and desirable.  4.  Any tissue or organs removed in the operation or other procedure may be disposed of by and at the discretion of the [facility] and [facility].  5.  I understand that in the event of a medical emergency, I will be transported by local paramedics to [facility] or other hospital emergency department.  6.  I certify that I have read and fully understand the above consent to operation and/or other procedure.    7.  I acknowledge that my physician has explained sedation/analgesia administration to me including the risks and benefits.  I consent to the administration of sedation/analgesia as may be necessary or desirable in the judgement of my physician.    Witness signature: ___________________________________________________ Date:   ______/______/_____                    Time:  ________ A.M.  P.M.       Patient Name:  ______________________________________________________  (please print)      Patient signature:  ___________________________________________________             Relationship to Patient:           []  Parent    Responsible person                          []  Spouse  In case of minor or                    [] Other  _____________   Incompetent name:  __________________________________________________                               (please print)      _____________      Responsible person  In case of minor or  Incompetent signature:  _______________________________________________    Statement of Physician  My signature below affirms that prior to the time of the procedure, I have explained to the patient and/or his/her guardian, the risks and benefits involved in the proposed treatment and any reasonable alternative to the proposed treatment.  I have also explained the risks and benefits involved in the refusal of the proposed treatment and have answered the patient's questions.                        Date:  ______/______/_______  Provider                      Signature:  __________________________________________________________       Time:  ___________ A.M    P.M.

## (undated) NOTE — MR AVS SNAPSHOT
[address]  [address] [facility]  [phone]               Thank you for choosing us for your health care visit with Jose Galindo MD.  We are glad to serve you and happy to provide you with this s Fluticasone Propionate  MCG/ACT Aero   Inhale 2 puffs into the lungs 2 (two) times daily. Commonly known as:  FLOVENT HFA           * guaiFENesin-codeine 100-10 MG/5ML Soln   Take 5 mL by mouth every 6 (six) hours as needed for cough.    What augustin weekend appointments for your exam are available. Walk-in patients are welcome for most exams. [facility]  Diagnostics [facility] Parking) (Yellow Parking)  [address]   [address] Eat plenty of protein, keep the fat content low Sugars:  sodas and sports drinks, candies and desserts   Eat plenty of low-fat dairy products High fat meats and dairy   Choose whole grain products Foods high in sodium   Water is best for hydration Fast olga

## (undated) NOTE — LETTER
05/19/21        [place] Rush  [address]      Dear Lorre Gowers,    Our records indicate that you have outstanding lab work and or testing that was ordered for you and has not yet been completed:  Orders Placed This Encounter         [place]

## (undated) NOTE — LETTER
05/19/21        Marilin Rush  [address]      Dear Prabhu Irene,    Our records indicate that you have outstanding lab work and or testing that was ordered for you and has not yet been completed:  Orders Placed This Encounter          Mamm

## (undated) NOTE — LETTER
AUTHORIZATION FOR SURGICAL OPERATION OR OTHER PROCEDURE  1. I hereby authorize Dr. Devon Mrarero, and [place] [facility] staff assigned to my case to perform the following operation and/or procedure at the [place] [facility]:    ________________Left knee cortisone injection_____________________      _______________________________________________________________________________________________    2. My physician has explained the nature and purpose of the operation or other procedure, possible alternative methods of treatment, the risks involved, and the possibility of complication to me. I acknowledge that no guarantee has been made as to the result that may be obtained. 3.  I recognize that, during the course of this operation, or other procedure, unforseen conditions may necessitate additional or different procedure than those listed above. I, therefore, further authorize and request that the above named physician, his/her physician assistants or designees perform such procedures as are, in his/her professional opinion, necessary and desirable. 4.  Any tissue or organs removed in the operation or other procedure may be disposed of by and at the discretion of the [facility] and [facility]. 5.  I understand that in the event of a medical emergency, I will be transported by local paramedics to [facility] or other hospital emergency department. 6.  I certify that I have read and fully understand the above consent to operation and/or other procedure. 7.  I acknowledge that my physician has explained sedation/analgesia administration to me including the risks and benefits. I consent to the administration of sedation/analgesia as may be necessary or desirable in the judgement of my physician. Witness signature: ___________________________________________________ Date:  ______/______/_____                    Time:  ________ A. M.  P.M.        Patient Name: ______________________________________________________  (please print)      Patient signature:  ___________________________________________________             Relationship to Patient:           []  Parent    Responsible person                          []  Spouse  In case of minor or                    [] Other  _____________   Incompetent name:  __________________________________________________                               (please print)      _____________      Responsible person  In case of minor or  Incompetent signature:  _______________________________________________    Statement of Physician  My signature below affirms that prior to the time of the procedure, I have explained to the patient and/or his/her guardian, the risks and benefits involved in the proposed treatment and any reasonable alternative to the proposed treatment. I have also explained the risks and benefits involved in the refusal of the proposed treatment and have answered the patient's questions.                         Date:  ______/______/_______  Provider                      Signature:  __________________________________________________________       Time:  ___________ A.M    P.M.